# Patient Record
Sex: MALE | Race: WHITE | Employment: OTHER | URBAN - METROPOLITAN AREA
[De-identification: names, ages, dates, MRNs, and addresses within clinical notes are randomized per-mention and may not be internally consistent; named-entity substitution may affect disease eponyms.]

---

## 2017-03-08 PROBLEM — Z79.899 ENCOUNTER FOR LONG-TERM (CURRENT) USE OF MEDICATIONS: Status: ACTIVE | Noted: 2017-03-08

## 2017-09-05 PROCEDURE — 82043 UR ALBUMIN QUANTITATIVE: CPT | Performed by: INTERNAL MEDICINE

## 2017-09-05 PROCEDURE — 84153 ASSAY OF PSA TOTAL: CPT | Performed by: INTERNAL MEDICINE

## 2017-09-05 PROCEDURE — 82570 ASSAY OF URINE CREATININE: CPT | Performed by: INTERNAL MEDICINE

## 2018-03-12 PROBLEM — I77.1 TORTUOUS AORTA (HCC): Status: ACTIVE | Noted: 2018-03-12

## 2018-03-12 PROBLEM — F32.4 MAJOR DEPRESSIVE DISORDER IN PARTIAL REMISSION (HCC): Status: ACTIVE | Noted: 2018-03-12

## 2018-03-13 PROBLEM — Z79.899 ENCOUNTER FOR LONG-TERM (CURRENT) USE OF MEDICATIONS: Status: ACTIVE | Noted: 2018-03-13

## 2018-03-13 PROBLEM — R13.14 PHARYNGOESOPHAGEAL DYSPHAGIA: Status: ACTIVE | Noted: 2018-03-13

## 2018-03-13 PROBLEM — I48.91 ATRIAL FIBRILLATION, UNSPECIFIED TYPE (HCC): Status: ACTIVE | Noted: 2018-03-13

## 2018-05-31 ENCOUNTER — LAB ENCOUNTER (OUTPATIENT)
Dept: LAB | Facility: HOSPITAL | Age: 83
End: 2018-05-31
Attending: INTERNAL MEDICINE
Payer: MEDICARE

## 2018-05-31 ENCOUNTER — HOSPITAL ENCOUNTER (OUTPATIENT)
Dept: GENERAL RADIOLOGY | Facility: HOSPITAL | Age: 83
Discharge: HOME | End: 2018-05-31
Attending: INTERNAL MEDICINE
Payer: MEDICARE

## 2018-05-31 DIAGNOSIS — T17.928A ASPIRATION OF FOOD, INITIAL ENCOUNTER: ICD-10-CM

## 2018-05-31 DIAGNOSIS — K92.1 BLACK STOOL: ICD-10-CM

## 2018-05-31 DIAGNOSIS — R13.14 DYSPHAGIA, PHARYNGOESOPHAGEAL: ICD-10-CM

## 2018-05-31 PROCEDURE — 92611 MOTION FLUOROSCOPY/SWALLOW: CPT

## 2018-05-31 PROCEDURE — 85025 COMPLETE CBC W/AUTO DIFF WBC: CPT

## 2018-05-31 PROCEDURE — 36415 COLL VENOUS BLD VENIPUNCTURE: CPT

## 2018-05-31 PROCEDURE — 74230 X-RAY XM SWLNG FUNCJ C+: CPT | Performed by: INTERNAL MEDICINE

## 2018-05-31 NOTE — PROGRESS NOTES
ADULT VIDEOFLUOROSCOPIC SWALLOWING STUDY    Admission Date: 5/31/2018  Evaluation Date: 05/31/18  Radiologist: Dr. Pisano Hands: Regular  Diet Recommendations - Liquid:  Thin    Further Follow-up:        Sudarshan successfully clearing his vocal quality. Family/Patient Goals: To avoid aspiration     ASSESSMENT   DYSPHAGIA ASSESSMENT  Test completed in conjunction with Radiologist.  Patient Positioned: Upright;Midline;Standard Chair.   Patient Viewed: Lateral. pharyngeal retention. Patient was not observed to aspirate any po. Patient able to clear residue from pharynx with multiple dry swallows but required cues to do so.   Suspect aspiration noted during esophagram completed 5/10/18 was due to nature of rate a

## 2018-06-05 PROBLEM — F32.4 MAJOR DEPRESSIVE DISORDER IN PARTIAL REMISSION (HCC): Status: ACTIVE | Noted: 2018-06-05

## 2018-06-07 ENCOUNTER — LABORATORY ENCOUNTER (OUTPATIENT)
Dept: LAB | Age: 83
End: 2018-06-07
Attending: INTERNAL MEDICINE
Payer: MEDICARE

## 2018-06-07 DIAGNOSIS — D50.0 IRON DEFICIENCY ANEMIA DUE TO CHRONIC BLOOD LOSS: ICD-10-CM

## 2018-06-07 DIAGNOSIS — Z12.11 ENCOUNTER FOR HEMOCCULT SCREENING: ICD-10-CM

## 2018-06-07 PROCEDURE — 85027 COMPLETE CBC AUTOMATED: CPT

## 2018-06-07 PROCEDURE — 36415 COLL VENOUS BLD VENIPUNCTURE: CPT

## 2018-06-10 PROBLEM — Z79.899 ENCOUNTER FOR LONG-TERM (CURRENT) USE OF MEDICATIONS: Status: ACTIVE | Noted: 2018-06-10

## 2018-06-10 PROBLEM — T17.908A ASPIRATION INTO RESPIRATORY TRACT: Status: ACTIVE | Noted: 2018-06-10

## 2018-06-10 PROBLEM — R13.19 DYSPHAGIA CAUSING PULMONARY ASPIRATION WITH SWALLOWING: Status: ACTIVE | Noted: 2018-06-10

## 2018-06-21 ENCOUNTER — LAB ENCOUNTER (OUTPATIENT)
Dept: LAB | Age: 83
End: 2018-06-21
Attending: RADIOLOGY
Payer: MEDICARE

## 2018-06-21 DIAGNOSIS — K92.1 BLACK STOOL: ICD-10-CM

## 2018-06-21 PROCEDURE — 85025 COMPLETE CBC W/AUTO DIFF WBC: CPT

## 2018-06-21 PROCEDURE — 36415 COLL VENOUS BLD VENIPUNCTURE: CPT

## 2018-08-09 PROBLEM — B02.29 PHN (POSTHERPETIC NEURALGIA): Status: ACTIVE | Noted: 2018-08-09

## 2018-08-09 PROBLEM — F32.5 MAJOR DEPRESSIVE DISORDER WITH SINGLE EPISODE, IN FULL REMISSION (HCC): Status: ACTIVE | Noted: 2018-08-09

## 2018-08-09 PROBLEM — I48.20 CHRONIC ATRIAL FIBRILLATION (HCC): Status: ACTIVE | Noted: 2018-03-13

## 2020-02-24 ENCOUNTER — LAB ENCOUNTER (OUTPATIENT)
Dept: LAB | Age: 85
End: 2020-02-24
Attending: INTERNAL MEDICINE
Payer: MEDICARE

## 2020-02-24 DIAGNOSIS — I10 ESSENTIAL HYPERTENSION: ICD-10-CM

## 2020-02-24 DIAGNOSIS — E03.8 OTHER SPECIFIED HYPOTHYROIDISM: ICD-10-CM

## 2020-02-24 DIAGNOSIS — E78.2 MIXED HYPERLIPIDEMIA: ICD-10-CM

## 2020-02-24 DIAGNOSIS — Z85.46 HISTORY OF PROSTATE CANCER: ICD-10-CM

## 2020-02-24 DIAGNOSIS — Z86.2 HISTORY OF ANEMIA: ICD-10-CM

## 2020-02-24 LAB
ALBUMIN SERPL-MCNC: 3.5 G/DL (ref 3.4–5)
ALBUMIN/GLOB SERPL: 1 {RATIO} (ref 1–2)
ALP LIVER SERPL-CCNC: 84 U/L (ref 45–117)
ALT SERPL-CCNC: 117 U/L (ref 16–61)
ANION GAP SERPL CALC-SCNC: 5 MMOL/L (ref 0–18)
AST SERPL-CCNC: 89 U/L (ref 15–37)
BILIRUB SERPL-MCNC: 1.1 MG/DL (ref 0.1–2)
BUN BLD-MCNC: 20 MG/DL (ref 7–18)
BUN/CREAT SERPL: 21.7 (ref 10–20)
CALCIUM BLD-MCNC: 8.8 MG/DL (ref 8.5–10.1)
CHLORIDE SERPL-SCNC: 109 MMOL/L (ref 98–112)
CHOLEST SMN-MCNC: 81 MG/DL (ref ?–200)
CO2 SERPL-SCNC: 25 MMOL/L (ref 21–32)
CREAT BLD-MCNC: 0.92 MG/DL (ref 0.7–1.3)
DEPRECATED RDW RBC AUTO: 45 FL (ref 35.1–46.3)
ERYTHROCYTE [DISTWIDTH] IN BLOOD BY AUTOMATED COUNT: 13.2 % (ref 11–15)
GLOBULIN PLAS-MCNC: 3.5 G/DL (ref 2.8–4.4)
GLUCOSE BLD-MCNC: 96 MG/DL (ref 70–99)
HCT VFR BLD AUTO: 40 % (ref 39–53)
HDLC SERPL-MCNC: 33 MG/DL (ref 40–59)
HGB BLD-MCNC: 12.9 G/DL (ref 13–17.5)
LDLC SERPL CALC-MCNC: 37 MG/DL (ref ?–100)
M PROTEIN MFR SERPL ELPH: 7 G/DL (ref 6.4–8.2)
MCH RBC QN AUTO: 30 PG (ref 26–34)
MCHC RBC AUTO-ENTMCNC: 32.3 G/DL (ref 31–37)
MCV RBC AUTO: 93 FL (ref 80–100)
NONHDLC SERPL-MCNC: 48 MG/DL (ref ?–130)
OSMOLALITY SERPL CALC.SUM OF ELEC: 290 MOSM/KG (ref 275–295)
PATIENT FASTING Y/N/NP: YES
PATIENT FASTING Y/N/NP: YES
PLATELET # BLD AUTO: 165 10(3)UL (ref 150–450)
POTASSIUM SERPL-SCNC: 4.2 MMOL/L (ref 3.5–5.1)
PSA SERPL-MCNC: 0.02 NG/ML (ref ?–4)
RBC # BLD AUTO: 4.3 X10(6)UL (ref 3.8–5.8)
SODIUM SERPL-SCNC: 139 MMOL/L (ref 136–145)
TRIGL SERPL-MCNC: 54 MG/DL (ref 30–149)
TSI SER-ACNC: 2.14 MIU/ML (ref 0.36–3.74)
VLDLC SERPL CALC-MCNC: 11 MG/DL (ref 0–30)
WBC # BLD AUTO: 8.6 X10(3) UL (ref 4–11)

## 2020-02-24 PROCEDURE — 85027 COMPLETE CBC AUTOMATED: CPT

## 2020-02-24 PROCEDURE — 84153 ASSAY OF PSA TOTAL: CPT

## 2020-02-24 PROCEDURE — 84443 ASSAY THYROID STIM HORMONE: CPT

## 2020-02-24 PROCEDURE — 80053 COMPREHEN METABOLIC PANEL: CPT

## 2020-02-24 PROCEDURE — 80061 LIPID PANEL: CPT

## 2020-02-24 PROCEDURE — 36415 COLL VENOUS BLD VENIPUNCTURE: CPT

## 2020-02-24 NOTE — PROGRESS NOTES
Chol is controlled with atorvastatin. CMP is okay. LFT's elevated. TSH is normal with levothyroxine 112mcg daily. PSA remains near undetectable. CBC is fine. Will review at visit.

## 2020-02-25 PROBLEM — F13.20 SEDATIVE DEPENDENCE (HCC): Status: ACTIVE | Noted: 2020-02-25

## 2020-02-25 PROBLEM — D69.2 SENILE PURPURA (HCC): Status: ACTIVE | Noted: 2020-02-25

## 2020-02-25 NOTE — PROGRESS NOTES
Released to [address]     Future Appointments  3/3/2020   8:00 AM    JULY LAYNE 2               [facility]          [facility]

## 2020-03-10 ENCOUNTER — LAB ENCOUNTER (OUTPATIENT)
Dept: LAB | Age: 85
End: 2020-03-10
Attending: INTERNAL MEDICINE
Payer: MEDICARE

## 2020-03-10 DIAGNOSIS — R79.89 ELEVATED LFTS: ICD-10-CM

## 2020-03-10 LAB
ALBUMIN SERPL-MCNC: 3.6 G/DL (ref 3.4–5)
ALBUMIN/GLOB SERPL: 1 {RATIO} (ref 1–2)
ALP LIVER SERPL-CCNC: 87 U/L (ref 45–117)
ALT SERPL-CCNC: 37 U/L (ref 16–61)
ANION GAP SERPL CALC-SCNC: 7 MMOL/L (ref 0–18)
AST SERPL-CCNC: 23 U/L (ref 15–37)
BILIRUB SERPL-MCNC: 1.1 MG/DL (ref 0.1–2)
BUN BLD-MCNC: 18 MG/DL (ref 7–18)
BUN/CREAT SERPL: 20 (ref 10–20)
CALCIUM BLD-MCNC: 9.1 MG/DL (ref 8.5–10.1)
CERULOPLASMIN SERPL-MCNC: 26.2 MG/DL (ref 20–60)
CHLORIDE SERPL-SCNC: 109 MMOL/L (ref 98–112)
CO2 SERPL-SCNC: 24 MMOL/L (ref 21–32)
CREAT BLD-MCNC: 0.9 MG/DL (ref 0.7–1.3)
DEPRECATED HBV CORE AB SER IA-ACNC: 58.6 NG/ML (ref 30–530)
GLOBULIN PLAS-MCNC: 3.6 G/DL (ref 2.8–4.4)
GLUCOSE BLD-MCNC: 82 MG/DL (ref 70–99)
HBV SURFACE AB SER QL: NONREACTIVE
HBV SURFACE AB SERPL IA-ACNC: <3.1 MIU/ML
HBV SURFACE AG SER-ACNC: <0.1 [IU]/L
HBV SURFACE AG SERPL QL IA: NONREACTIVE
IRON SATURATION: 20 % (ref 20–50)
IRON SERPL-MCNC: 75 UG/DL (ref 65–175)
M PROTEIN MFR SERPL ELPH: 7.2 G/DL (ref 6.4–8.2)
OSMOLALITY SERPL CALC.SUM OF ELEC: 291 MOSM/KG (ref 275–295)
PATIENT FASTING Y/N/NP: YES
POTASSIUM SERPL-SCNC: 3.9 MMOL/L (ref 3.5–5.1)
SODIUM SERPL-SCNC: 140 MMOL/L (ref 136–145)
TOTAL IRON BINDING CAPACITY: 378 UG/DL (ref 240–450)
TRANSFERRIN SERPL-MCNC: 254 MG/DL (ref 200–360)

## 2020-03-10 PROCEDURE — 87522 HEPATITIS C REVRS TRNSCRPJ: CPT

## 2020-03-10 PROCEDURE — 83516 IMMUNOASSAY NONANTIBODY: CPT

## 2020-03-10 PROCEDURE — 87340 HEPATITIS B SURFACE AG IA: CPT

## 2020-03-10 PROCEDURE — 83540 ASSAY OF IRON: CPT

## 2020-03-10 PROCEDURE — 83550 IRON BINDING TEST: CPT

## 2020-03-10 PROCEDURE — 86706 HEP B SURFACE ANTIBODY: CPT

## 2020-03-10 PROCEDURE — 36415 COLL VENOUS BLD VENIPUNCTURE: CPT

## 2020-03-10 PROCEDURE — 80053 COMPREHEN METABOLIC PANEL: CPT

## 2020-03-10 PROCEDURE — 86038 ANTINUCLEAR ANTIBODIES: CPT

## 2020-03-10 PROCEDURE — 82728 ASSAY OF FERRITIN: CPT

## 2020-03-10 PROCEDURE — 82390 ASSAY OF CERULOPLASMIN: CPT

## 2020-03-11 LAB
ANA SCREEN: NEGATIVE
F-ACTIN (SMOOTH MUSCLE) AB: 12 UNITS
HCV RNA SERPL QL NAA+PROBE: NOT DETECTED

## 2020-03-12 NOTE — PROGRESS NOTES
DANIEL negative. Anti-smooth muscle Ab negative. Hep C negative. Hep B negative. Ceruloplasmin normal.  Iron studies normal.  CMP normal.  LFT's normalized.     Liver normal.  No evidence for any autoimmune hepatitis, viral hepatitis, Roe's disease or i

## 2020-08-03 ENCOUNTER — APPOINTMENT (OUTPATIENT)
Dept: CV DIAGNOSTICS | Facility: HOSPITAL | Age: 85
End: 2020-08-03
Attending: INTERNAL MEDICINE
Payer: MEDICARE

## 2020-08-03 ENCOUNTER — APPOINTMENT (OUTPATIENT)
Dept: CT IMAGING | Facility: HOSPITAL | Age: 85
End: 2020-08-03
Attending: EMERGENCY MEDICINE
Payer: MEDICARE

## 2020-08-03 ENCOUNTER — HOSPITAL ENCOUNTER (OUTPATIENT)
Facility: HOSPITAL | Age: 85
Setting detail: OBSERVATION
Discharge: NURSING, REHAB OR LONG-TERM CARE | End: 2020-08-10
Attending: EMERGENCY MEDICINE | Admitting: HOSPITALIST
Payer: MEDICARE

## 2020-08-03 ENCOUNTER — APPOINTMENT (OUTPATIENT)
Dept: GENERAL RADIOLOGY | Facility: HOSPITAL | Age: 85
End: 2020-08-03
Attending: EMERGENCY MEDICINE
Payer: MEDICARE

## 2020-08-03 DIAGNOSIS — R41.0 CONFUSION: ICD-10-CM

## 2020-08-03 DIAGNOSIS — R77.8 ELEVATED TROPONIN: Primary | ICD-10-CM
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
## 2020-08-03 DIAGNOSIS — M62.82 NON-TRAUMATIC RHABDOMYOLYSIS: ICD-10-CM

## 2020-08-03 LAB
ALBUMIN SERPL-MCNC: 3.7 G/DL (ref 3.4–5)
ALBUMIN/GLOB SERPL: 1.1 {RATIO} (ref 1–2)
ALP LIVER SERPL-CCNC: 91 U/L (ref 45–117)
ALT SERPL-CCNC: 36 U/L (ref 16–61)
ANION GAP SERPL CALC-SCNC: 6 MMOL/L (ref 0–18)
AST SERPL-CCNC: 61 U/L (ref 15–37)
ATRIAL RATE: 94 BPM
BASOPHILS # BLD AUTO: 0.04 X10(3) UL (ref 0–0.2)
BASOPHILS NFR BLD AUTO: 0.4 %
BILIRUB SERPL-MCNC: 1.6 MG/DL (ref 0.1–2)
BILIRUB UR QL STRIP.AUTO: NEGATIVE
BUN BLD-MCNC: 23 MG/DL (ref 7–18)
BUN/CREAT SERPL: 24.5 (ref 10–20)
CALCIUM BLD-MCNC: 9.5 MG/DL (ref 8.5–10.1)
CHLORIDE SERPL-SCNC: 106 MMOL/L (ref 98–112)
CK SERPL-CCNC: 1257 U/L (ref 39–308)
CO2 SERPL-SCNC: 27 MMOL/L (ref 21–32)
CREAT BLD-MCNC: 0.94 MG/DL (ref 0.7–1.3)
DEPRECATED RDW RBC AUTO: 57.6 FL (ref 35.1–46.3)
EOSINOPHIL # BLD AUTO: 0 X10(3) UL (ref 0–0.7)
EOSINOPHIL NFR BLD AUTO: 0 %
ERYTHROCYTE [DISTWIDTH] IN BLOOD BY AUTOMATED COUNT: 21.1 % (ref 11–15)
GLOBULIN PLAS-MCNC: 3.4 G/DL (ref 2.8–4.4)
GLUCOSE BLD-MCNC: 114 MG/DL (ref 70–99)
GLUCOSE UR STRIP.AUTO-MCNC: NEGATIVE MG/DL
HCT VFR BLD AUTO: 30.3 % (ref 39–53)
HGB BLD-MCNC: 9 G/DL (ref 13–17.5)
IMM GRANULOCYTES # BLD AUTO: 0.04 X10(3) UL (ref 0–1)
IMM GRANULOCYTES NFR BLD: 0.4 %
KETONES UR STRIP.AUTO-MCNC: NEGATIVE MG/DL
LEUKOCYTE ESTERASE UR QL STRIP.AUTO: NEGATIVE
LYMPHOCYTES # BLD AUTO: 1.85 X10(3) UL (ref 1–4)
LYMPHOCYTES NFR BLD AUTO: 18.3 %
M PROTEIN MFR SERPL ELPH: 7.1 G/DL (ref 6.4–8.2)
MCH RBC QN AUTO: 22.7 PG (ref 26–34)
MCHC RBC AUTO-ENTMCNC: 29.7 G/DL (ref 31–37)
MCV RBC AUTO: 76.5 FL (ref 80–100)
MONOCYTES # BLD AUTO: 0.76 X10(3) UL (ref 0.1–1)
MONOCYTES NFR BLD AUTO: 7.5 %
NEUTROPHILS # BLD AUTO: 7.41 X10 (3) UL (ref 1.5–7.7)
NEUTROPHILS # BLD AUTO: 7.41 X10(3) UL (ref 1.5–7.7)
NEUTROPHILS NFR BLD AUTO: 73.4 %
NITRITE UR QL STRIP.AUTO: NEGATIVE
OSMOLALITY SERPL CALC.SUM OF ELEC: 293 MOSM/KG (ref 275–295)
PH UR STRIP.AUTO: 5 [PH] (ref 4.5–8)
PLATELET # BLD AUTO: 224 10(3)UL (ref 150–450)
POTASSIUM SERPL-SCNC: 3.7 MMOL/L (ref 3.5–5.1)
PROT UR STRIP.AUTO-MCNC: 100 MG/DL
Q-T INTERVAL: 426 MS
QRS DURATION: 100 MS
QTC CALCULATION (BEZET): 469 MS
R AXIS: -21 DEGREES
RBC # BLD AUTO: 3.96 X10(6)UL (ref 3.8–5.8)
SARS-COV-2 RNA RESP QL NAA+PROBE: NOT DETECTED
SODIUM SERPL-SCNC: 139 MMOL/L (ref 136–145)
SP GR UR STRIP.AUTO: 1.02 (ref 1–1.03)
T AXIS: 116 DEGREES
TROPONIN I SERPL-MCNC: 0.15 NG/ML (ref ?–0.04)
UROBILINOGEN UR STRIP.AUTO-MCNC: <2 MG/DL
VENTRICULAR RATE: 73 BPM
WBC # BLD AUTO: 10.1 X10(3) UL (ref 4–11)

## 2020-08-03 PROCEDURE — 93005 ELECTROCARDIOGRAM TRACING: CPT

## 2020-08-03 PROCEDURE — 99203 OFFICE O/P NEW LOW 30 MIN: CPT | Performed by: INTERNAL MEDICINE

## 2020-08-03 PROCEDURE — 81001 URINALYSIS AUTO W/SCOPE: CPT | Performed by: EMERGENCY MEDICINE

## 2020-08-03 PROCEDURE — 96361 HYDRATE IV INFUSION ADD-ON: CPT

## 2020-08-03 PROCEDURE — 80053 COMPREHEN METABOLIC PANEL: CPT | Performed by: EMERGENCY MEDICINE

## 2020-08-03 PROCEDURE — 70450 CT HEAD/BRAIN W/O DYE: CPT | Performed by: EMERGENCY MEDICINE

## 2020-08-03 PROCEDURE — 93306 TTE W/DOPPLER COMPLETE: CPT | Performed by: INTERNAL MEDICINE

## 2020-08-03 PROCEDURE — 93010 ELECTROCARDIOGRAM REPORT: CPT

## 2020-08-03 PROCEDURE — 73560 X-RAY EXAM OF KNEE 1 OR 2: CPT | Performed by: EMERGENCY MEDICINE

## 2020-08-03 PROCEDURE — 99285 EMERGENCY DEPT VISIT HI MDM: CPT

## 2020-08-03 PROCEDURE — 82550 ASSAY OF CK (CPK): CPT | Performed by: EMERGENCY MEDICINE

## 2020-08-03 PROCEDURE — 96372 THER/PROPH/DIAG INJ SC/IM: CPT

## 2020-08-03 PROCEDURE — 84484 ASSAY OF TROPONIN QUANT: CPT | Performed by: EMERGENCY MEDICINE

## 2020-08-03 PROCEDURE — 72125 CT NECK SPINE W/O DYE: CPT | Performed by: EMERGENCY MEDICINE

## 2020-08-03 PROCEDURE — 96365 THER/PROPH/DIAG IV INF INIT: CPT

## 2020-08-03 PROCEDURE — 85025 COMPLETE CBC W/AUTO DIFF WBC: CPT | Performed by: EMERGENCY MEDICINE

## 2020-08-03 RX ORDER — LEVOTHYROXINE SODIUM 112 UG/1
112 TABLET ORAL
Status: ON HOLD | COMMUNITY
End: 2020-08-07

## 2020-08-03 RX ORDER — HEPARIN SODIUM 5000 [USP'U]/ML
5000 INJECTION, SOLUTION INTRAVENOUS; SUBCUTANEOUS EVERY 8 HOURS SCHEDULED
Status: DISCONTINUED | OUTPATIENT
Start: 2020-08-03 | End: 2020-08-04

## 2020-08-03 RX ORDER — TRAZODONE HYDROCHLORIDE 50 MG/1
50 TABLET ORAL NIGHTLY
COMMUNITY
End: 2020-10-18

## 2020-08-03 RX ORDER — TRAZODONE HYDROCHLORIDE 50 MG/1
50 TABLET ORAL NIGHTLY
Status: DISCONTINUED | OUTPATIENT
Start: 2020-08-03 | End: 2020-08-10

## 2020-08-03 RX ORDER — PAROXETINE 10 MG/1
10 TABLET, FILM COATED ORAL DAILY
Status: DISCONTINUED | OUTPATIENT
Start: 2020-08-04 | End: 2020-08-10

## 2020-08-03 RX ORDER — SENNOSIDES 8.6 MG
650 CAPSULE ORAL 2 TIMES DAILY
COMMUNITY

## 2020-08-03 RX ORDER — DOCUSATE SODIUM 100 MG/1
100 CAPSULE, LIQUID FILLED ORAL 2 TIMES DAILY PRN
Status: DISCONTINUED | OUTPATIENT
Start: 2020-08-03 | End: 2020-08-09

## 2020-08-03 RX ORDER — SODIUM CHLORIDE 9 MG/ML
INJECTION, SOLUTION INTRAVENOUS ONCE
Status: COMPLETED | OUTPATIENT
Start: 2020-08-03 | End: 2020-08-03

## 2020-08-03 RX ORDER — POLYETHYLENE GLYCOL 3350 17 G/17G
17 POWDER, FOR SOLUTION ORAL DAILY PRN
Status: DISCONTINUED | OUTPATIENT
Start: 2020-08-03 | End: 2020-08-10

## 2020-08-03 RX ORDER — ASPIRIN 81 MG/1
81 TABLET ORAL DAILY
Status: DISCONTINUED | OUTPATIENT
Start: 2020-08-04 | End: 2020-08-10

## 2020-08-03 RX ORDER — PAROXETINE 10 MG/1
10 TABLET, FILM COATED ORAL DAILY
COMMUNITY
End: 2020-10-18

## 2020-08-03 RX ORDER — LEVOTHYROXINE SODIUM 112 UG/1
112 TABLET ORAL
Status: DISCONTINUED | OUTPATIENT
Start: 2020-08-04 | End: 2020-08-04

## 2020-08-03 RX ORDER — FUROSEMIDE 20 MG/1
20 TABLET ORAL DAILY PRN
Status: ON HOLD | COMMUNITY
End: 2020-08-05

## 2020-08-03 RX ORDER — ACETAMINOPHEN 325 MG/1
650 TABLET ORAL EVERY 6 HOURS PRN
Status: DISCONTINUED | OUTPATIENT
Start: 2020-08-03 | End: 2020-08-10

## 2020-08-03 RX ORDER — BUPRENORPHINE HCL 8 MG/1
1 TABLET SUBLINGUAL DAILY
COMMUNITY
End: 2020-08-03

## 2020-08-03 NOTE — CONSULTS
[facility]    PATIENT'S NAME: Laureen Gold   ATTENDING PHYSICIAN: Xiomara Edgar D.O.   CONSULTING PHYSICIAN: Mani Li M.D.    PATIENT ACCOUNT#:   [de-identified]    LOCATION:  [address] [place] [address] [facility]  MEDICAL RECORD #:   [id number]       DATE OF B changes. The patient is not short of breath, has no peripheral edema, and has no chest pain currently. PAST MEDICAL HISTORY:  CAD, heart failure.     MEDICATIONS:  Home medications:  Levothyroxine 112 mcg per day; paroxetine 10 mg at dinner; trazodone 5 function. Conservative medical management approach. He was moved up from 81 mg to 325. We will stop his diuretics at this point. He may need restart of ramipril but need to see what his blood pressure does.     Postdischarge, other plan is to him follow

## 2020-08-03 NOTE — ED INITIAL ASSESSMENT (HPI)
Family called ems tonoc for ams  Pt has dementia and has fallen twice in past 24 hrs  ccollar per ems    Daughter arrived and reports pt called his other daughter at 36 - not able to hear pt d/t phone issues.    Pt lives in [facility] in a to

## 2020-08-03 NOTE — CONSULTS
Cardiology Consult Note     PRIMARY CARDIOLOGIST: Dr. Chelo Hall with [facility]      CONSULT FOR: Elevated troponin in 80year-old with recurrent falls and mild case of rhabdomyolysis.        HISTORY: 59-year-old gentleman mild hypertension previo

## 2020-08-03 NOTE — PROGRESS NOTES
08/03/20 1514   Clinical Encounter Type   Visited With Health care provider  ( responded to POLST consult - patient not charted as DNAR)   Routine Visit Introduction  (Printed POLST form and left on patient chart.  Discussion with RN regarding co

## 2020-08-03 NOTE — ED NOTES
COVID SWAB SENT. DIAPER CHANGED = URINE SOAKED. PAD AND URINAL NOW IN PLACE FOR URINE SPECIMEN. DAUGHTERS REMAIN IN CAR/PARKING LOT.

## 2020-08-03 NOTE — H&P
[facility] Hospitalist H&P       CC: Patient presents with:  Altered Mental Status  Trauma       PCP: Lilliana Hairston MD    History of Present Illness: Patient is a 80year old male with PMH sig for CAD, A fib, HTN, HL and hypothyroidism is admitted after 2 falls Medications:  Levothyroxine Sodium 112 MCG Oral Tab, Take 112 mcg by mouth before breakfast., Disp: , Rfl:   PARoxetine HCl 10 MG Oral Tab, Take 10 mg by mouth daily. With dinner, Disp: , Rfl:   traZODone HCl 50 MG Oral Tab, Take 50 mg by mouth nightly. , D Regular rate and rhythm, S1, S2 normal, no murmur, rub or gallop appreciated   Abdomen:   Soft, non-tender. Bowel sounds normal. No masses,  No organomegaly. Non distended   Extremities: Extremities normal, atraumatic, no cyanosis or edema.    Skin: Skin co Dose information is transmitted to the [facility] of Radiology) NRDR ([address]) which includes the Dose Index Registry. PATIENT STATED HISTORY: (As transcribed by Technologist)  Multiple falls over last 24 hours.     FIND mass.  There is a small right pleural effusion noted. BONES:  There is fusion across the C2-C3 disc space. There is no acute fracture. CERVICAL DISC LEVELS: C2-C3:  No significant disc/facet abnormality, spinal stenosis, or foraminal stenosis. C3-C4:   Bi cardiology  -cardiology following    Elevated AST  -check LFTs in am    Rhabdomyolysis  -gentle IVF given underlying cardiac issues  -repeat CK in am    CAD, HTN  -hold lasix given rhabdo & dehydration   -resume when ok per cardiology    Hypothyroidism  -c

## 2020-08-03 NOTE — ED NOTES
Pt's daughter Alberto Villagran can be reached on her cell phone - she is waiting in her car with other family.

## 2020-08-03 NOTE — ED PROVIDER NOTES
Patient Seen in: [facility] Emergency Department      History   Patient presents with:  Altered Mental Status  Trauma    Stated Complaint: pt has fallen twice in past 24 hrs  ems called for ams per family  hx dementia    HPI    80-year-old male hist in HPI. Constitutional and vital signs reviewed. All other systems reviewed and negative except as noted above.     Physical Exam     ED Triage Vitals [08/03/20 0541]   /75   Pulse 76   Resp 20   Temp 98.1 °F (36.7 °C)   Temp src Temporal   SpO2 (per daughter, speech at baseline)   Psychiatric:         Mood and Affect: Mood normal.             ED Course     Labs Reviewed   COMP METABOLIC PANEL (14) - Abnormal; Notable for the following components:       Result Value    Glucose 114 (*)     BUN 23 ( 8/3/2020  PROCEDURE:  XR KNEE (1 OR 2 VIEWS), RIGHT (CPT=73560)  COMPARISON:  None.   INDICATIONS:  pt has fallen twice in past 24 hrs  ems called for ams per family  hx dementia  PATIENT STATED HISTORY: (As transcribed by Technologist)  Patient's family me None.         CONCLUSION:  There is chronic and artery ischemic change and marked atrophy noted. There is no evidence of an acute abnormality on the noncontrast CT of the head.    Dictated by (CST): Vladimir Magallanes MD on 8/03/2020 at 7:07 AM     Alejandrina Daniels CONCLUSION:  There is no acute fracture in the cervical spine. There is diffuse degenerative change of discs and facets described above. Small right pleural effusion is noted incidentally.    Dictated by (CST): Karen Child MD on 8/03/2020 at 7:09 AM

## 2020-08-04 LAB
ALBUMIN SERPL-MCNC: 2.9 G/DL (ref 3.4–5)
ALBUMIN/GLOB SERPL: 1 {RATIO} (ref 1–2)
ALP LIVER SERPL-CCNC: 73 U/L (ref 45–117)
ALT SERPL-CCNC: 33 U/L (ref 16–61)
ANION GAP SERPL CALC-SCNC: 2 MMOL/L (ref 0–18)
AST SERPL-CCNC: 51 U/L (ref 15–37)
BASOPHILS # BLD AUTO: 0.06 X10(3) UL (ref 0–0.2)
BASOPHILS NFR BLD AUTO: 0.7 %
BILIRUB SERPL-MCNC: 0.8 MG/DL (ref 0.1–2)
BUN BLD-MCNC: 25 MG/DL (ref 7–18)
BUN/CREAT SERPL: 28.1 (ref 10–20)
CALCIUM BLD-MCNC: 8.3 MG/DL (ref 8.5–10.1)
CHLORIDE SERPL-SCNC: 109 MMOL/L (ref 98–112)
CHOLEST SMN-MCNC: 86 MG/DL (ref ?–200)
CK SERPL-CCNC: 505 U/L (ref 39–308)
CO2 SERPL-SCNC: 29 MMOL/L (ref 21–32)
CREAT BLD-MCNC: 0.89 MG/DL (ref 0.7–1.3)
DEPRECATED HBV CORE AB SER IA-ACNC: 21.5 NG/ML (ref 30–530)
DEPRECATED RDW RBC AUTO: 59.5 FL (ref 35.1–46.3)
EOSINOPHIL # BLD AUTO: 0.1 X10(3) UL (ref 0–0.7)
EOSINOPHIL NFR BLD AUTO: 1.1 %
ERYTHROCYTE [DISTWIDTH] IN BLOOD BY AUTOMATED COUNT: 21.2 % (ref 11–15)
GLOBULIN PLAS-MCNC: 3 G/DL (ref 2.8–4.4)
GLUCOSE BLD-MCNC: 90 MG/DL (ref 70–99)
HAV IGM SER QL: 2.2 MG/DL (ref 1.6–2.6)
HCT VFR BLD AUTO: 27.6 % (ref 39–53)
HDLC SERPL-MCNC: 41 MG/DL (ref 40–59)
HGB BLD-MCNC: 7.9 G/DL (ref 13–17.5)
IMM GRANULOCYTES # BLD AUTO: 0.03 X10(3) UL (ref 0–1)
IMM GRANULOCYTES NFR BLD: 0.3 %
IRON SATURATION: 6 % (ref 20–50)
IRON SERPL-MCNC: 23 UG/DL (ref 65–175)
LDLC SERPL CALC-MCNC: 36 MG/DL (ref ?–100)
LYMPHOCYTES # BLD AUTO: 2.36 X10(3) UL (ref 1–4)
LYMPHOCYTES NFR BLD AUTO: 26.4 %
M PROTEIN MFR SERPL ELPH: 5.9 G/DL (ref 6.4–8.2)
MCH RBC QN AUTO: 22.2 PG (ref 26–34)
MCHC RBC AUTO-ENTMCNC: 28.6 G/DL (ref 31–37)
MCV RBC AUTO: 77.5 FL (ref 80–100)
MONOCYTES # BLD AUTO: 0.83 X10(3) UL (ref 0.1–1)
MONOCYTES NFR BLD AUTO: 9.3 %
NEUTROPHILS # BLD AUTO: 5.57 X10 (3) UL (ref 1.5–7.7)
NEUTROPHILS # BLD AUTO: 5.57 X10(3) UL (ref 1.5–7.7)
NEUTROPHILS NFR BLD AUTO: 62.2 %
NONHDLC SERPL-MCNC: 45 MG/DL (ref ?–130)
OSMOLALITY SERPL CALC.SUM OF ELEC: 294 MOSM/KG (ref 275–295)
PLATELET # BLD AUTO: 201 10(3)UL (ref 150–450)
POTASSIUM SERPL-SCNC: 3.8 MMOL/L (ref 3.5–5.1)
RBC # BLD AUTO: 3.56 X10(6)UL (ref 3.8–5.8)
SODIUM SERPL-SCNC: 140 MMOL/L (ref 136–145)
T4 FREE SERPL-MCNC: 0.8 NG/DL (ref 0.8–1.7)
TOTAL IRON BINDING CAPACITY: 407 UG/DL (ref 240–450)
TRANSFERRIN SERPL-MCNC: 273 MG/DL (ref 200–360)
TRIGL SERPL-MCNC: 44 MG/DL (ref 30–149)
TROPONIN I SERPL-MCNC: 0.13 NG/ML (ref ?–0.04)
TSI SER-ACNC: 12.8 MIU/ML (ref 0.36–3.74)
VLDLC SERPL CALC-MCNC: 9 MG/DL (ref 0–30)
WBC # BLD AUTO: 9 X10(3) UL (ref 4–11)

## 2020-08-04 PROCEDURE — 99214 OFFICE O/P EST MOD 30 MIN: CPT | Performed by: INTERNAL MEDICINE

## 2020-08-04 PROCEDURE — 85025 COMPLETE CBC W/AUTO DIFF WBC: CPT | Performed by: INTERNAL MEDICINE

## 2020-08-04 PROCEDURE — 97530 THERAPEUTIC ACTIVITIES: CPT

## 2020-08-04 PROCEDURE — 82550 ASSAY OF CK (CPK): CPT | Performed by: HOSPITALIST

## 2020-08-04 PROCEDURE — 97165 OT EVAL LOW COMPLEX 30 MIN: CPT

## 2020-08-04 PROCEDURE — 83735 ASSAY OF MAGNESIUM: CPT | Performed by: INTERNAL MEDICINE

## 2020-08-04 PROCEDURE — 82728 ASSAY OF FERRITIN: CPT | Performed by: HOSPITALIST

## 2020-08-04 PROCEDURE — 84443 ASSAY THYROID STIM HORMONE: CPT | Performed by: INTERNAL MEDICINE

## 2020-08-04 PROCEDURE — 83550 IRON BINDING TEST: CPT | Performed by: HOSPITALIST

## 2020-08-04 PROCEDURE — 97535 SELF CARE MNGMENT TRAINING: CPT

## 2020-08-04 PROCEDURE — 80053 COMPREHEN METABOLIC PANEL: CPT | Performed by: INTERNAL MEDICINE

## 2020-08-04 PROCEDURE — 97162 PT EVAL MOD COMPLEX 30 MIN: CPT

## 2020-08-04 PROCEDURE — 83540 ASSAY OF IRON: CPT | Performed by: HOSPITALIST

## 2020-08-04 PROCEDURE — 84439 ASSAY OF FREE THYROXINE: CPT | Performed by: INTERNAL MEDICINE

## 2020-08-04 PROCEDURE — 84484 ASSAY OF TROPONIN QUANT: CPT | Performed by: INTERNAL MEDICINE

## 2020-08-04 PROCEDURE — 80061 LIPID PANEL: CPT | Performed by: INTERNAL MEDICINE

## 2020-08-04 PROCEDURE — 96375 TX/PRO/DX INJ NEW DRUG ADDON: CPT

## 2020-08-04 PROCEDURE — 96372 THER/PROPH/DIAG INJ SC/IM: CPT

## 2020-08-04 RX ORDER — LEVOTHYROXINE SODIUM 0.12 MG/1
125 TABLET ORAL
Status: DISCONTINUED | OUTPATIENT
Start: 2020-08-05 | End: 2020-08-10

## 2020-08-04 RX ORDER — PANTOPRAZOLE SODIUM 40 MG/1
40 TABLET, DELAYED RELEASE ORAL
Status: DISCONTINUED | OUTPATIENT
Start: 2020-08-04 | End: 2020-08-04

## 2020-08-04 RX ORDER — PANTOPRAZOLE SODIUM 40 MG/1
40 TABLET, DELAYED RELEASE ORAL
Status: DISCONTINUED | OUTPATIENT
Start: 2020-08-04 | End: 2020-08-10

## 2020-08-04 RX ORDER — POTASSIUM CHLORIDE 20 MEQ/1
40 TABLET, EXTENDED RELEASE ORAL ONCE
Status: COMPLETED | OUTPATIENT
Start: 2020-08-04 | End: 2020-08-04

## 2020-08-04 NOTE — PROGRESS NOTES
08/04/20 1130   Clinical Encounter Type   Visited With Patient   Patient's Supportive Strategies/Resources Patient finds community/spiritual support from his neighbor, a deacon, who brings him Denominational communion.     Hindu Encounters   Hindu Need

## 2020-08-04 NOTE — PLAN OF CARE
Received patient, sleeping, rousable. Forgetful. Denied chest pain, denied SOB. Dsicussed POc. Safety measures reinforced, call light within reach, bed alarm on. Needs attended to. Will continue to monitor.     Problem: CARDIOVASCULAR - ADULT  Goal: Maint

## 2020-08-04 NOTE — CONSULTS
[facility]    Roberto Dobson Patient Status:  Observation    1929 MRN [id number]   [facility] 8NE-A Attending Sami Skinner,    Hosp Day # 0 PCP Khoi Warner MD     Patient Identification  Roberto Dobson is a 80year old valve: Trivial regurgitation. 3.  Aorta: Ascending aorta diameter was 3.6 -3.7 cm.  4.  Mitral valve: There was mild regurgitation. 5.  Left atrium: The left atrium was moderately dilated. The left atrial      volume was moderately to markedly increased. • RIGHT PHACOEMULSIFICATION OF CATARACT WITH INTRAOCULAR LENS IMPLANT 69015 Right 2/19/2014    Performed by Yonatan Zamora MD at [facility]       [COMPLETED] Potassium Chloride ER (K-DUR M20) CR tab 40 mEq, 40 mEq, Oral, Once  [COMPLETED] ALT 33 08/04/2020    T4F 0.8 08/04/2020    TSH 12.800 08/04/2020    MG 2.2 08/04/2020    TROP 0.129 08/04/2020     08/04/2020       Review of Systems:  Complete review of systems performed and negative except as that outlined in HPI.       OBJECTIVE: ASSESSMENT:  Impaired mobility and self-care secondary to Recent falls x2. Possible dehydration    ?  UTI      PLAN:                   Based on patient's current functional status, pt would benefit from acute Rehabilitation, working

## 2020-08-04 NOTE — CM/SW NOTE
Dr. Tennille Newman saw pt for possibility of acute rehab, and she is recommending acute rehab at discharge. SW spoke to pt's daughter, Marla Zhong, to inform her of the update. Daughter was very appreciative, and is familiar with [facility].  She stated that pt/family wou

## 2020-08-04 NOTE — CM/SW NOTE
08/04/20 1400   CM/SW Referral Data   Referral Source Social Work (self-referral)   Reason for Referral Discharge planning   Informant Patient; Children   Patient Info   Patient's Mental Status Alert;Oriented   Patient Communication Issues Hearing impair explained the [place] referral process. Daughter has no preference of [facility] choices. Referrals started in [place]. DON requested. Will follow up with daughter on Wednesday on where pt is accepted at for [facility].     SW also called [facility] at [facility] to see if pt is appropr Chief Complaint:    HPI:  76 year old female with PMHx HTN, HLD, CAD, ADVANCED COPD ON HOME O2 (3LITERS), IDDM, CHRONIC DIASTOLIC HEART FAILURE, RECENT ADMISSIONS FOR HYPOXIC RESPIRATORY FAILURE STATES SHE WAS LEAVING [employer] [facility] EARLIER TODAY WITH HER BROTHER AND MISSTEPPED RESULTING IN A FALL.  SHE REPORTS HAVING NECK AND LOWER BACK PAIN, ALSO LEFT HIP PAIN.  PT DENIES ANY CHEST PAIN OR SHORTNESS OF BREATH, NO NAUSEA OR VOMITING. JUST RECEIVED DILAUDID AND FEELS SOMEWHAT BETTER. (27 Sep 2017 19:56)    : History reviewed; as above. Pulmonary consult requested for pre-op pulmonary eval. Patient followed by Dr. Forte in the office. Patient is presently breathing comfortably, luing flat in bed. . Family at bedside. She is sleeping but is arousable; denies shortness of breath.     Pulmonary hx significant for COPD and lung cancer; S/P lobectomy.    PAST MEDICAL & SURGICAL HISTORY:  Chronic diastolic congestive heart failure  Hyperlipidemia, unspecified hyperlipidemia type  Essential hypertension  Neuropathy  Diabetes  Chronic obstructive pulmonary disease, unspecified COPD type  History of total knee replacement, unspecified laterality  H/O hernia repair   delivery delivered  S/P appendectomy    Social Hx: Former smoker.     REVIEW OF SYSTEMS:    CONSTITUTIONAL: No weakness, fevers or chills  EYES/ENT: No visual changes;  No vertigo or throat pain   NECK: No pain or stiffness  RESPIRATORY: No cough, wheezing, hemoptysis; No shortness of breath  CARDIOVASCULAR: No chest pain or palpitations  GASTROINTESTINAL: No abdominal or epigastric pain. No nausea, vomiting, or hematemesis; No diarrhea or constipation. No melena or hematochezia.  GENITOURINARY: No dysuria, frequency or hematuria  NEUROLOGICAL: No numbness or weakness  SKIN: No itching, burning, rashes, or lesions   All other review of systems is negative unless indicated above    Vital Signs Last 24 Hrs  T(C): 37.4 (28 Sep 2017 05:13), Max: 37.4 (28 Sep 2017 05:13)  T(F): 99.4 (28 Sep 2017 05:13), Max: 99.4 (28 Sep 2017 05:13)  HR: 84 (28 Sep 2017 05:13) (51 - 84)  BP: 142/38 (28 Sep 2017 05:13) (126/50 - 142/38)  BP(mean): --  RR: 13 (27 Sep 2017 20:16) (13 - 20)  SpO2: 92% (28 Sep 2017 05:13) (92% - 96%)    I&O's Summary    27 Sep 2017 07:01  -  28 Sep 2017 07:00  --------------------------------------------------------  IN: 0 mL / OUT: 650 mL / NET: -650 mL        PHYSICAL EXAM:    Constitutional: NAD, awake and alert, well-developed  Neck: Soft and supple, No LAD, No JVD  Respiratory: Breath sounds are clear bilaterally, No wheezing, rales or rhonchi  Cardiovascular: S1 and S2, regular rate and rhythm, no Murmurs, gallops or rubs  Gastrointestinal: Bowel Sounds present, soft, nontender, nondistended, no guarding, no rebound  Extremities: No peripheral edema  Neurological: A/O x 3, no focal deficits  Skin: No rashes    Medications:  MEDICATIONS  (STANDING):  docusate sodium 100 milliGRAM(s) Oral three times a day  diltiazem    milliGRAM(s) Oral daily  citalopram 10 milliGRAM(s) Oral daily  atorvastatin 20 milliGRAM(s) Oral at bedtime  pantoprazole    Tablet 40 milliGRAM(s) Oral before breakfast  levothyroxine 112 MICROGram(s) Oral daily  folic acid 1 milliGRAM(s) Oral daily  insulin glargine Injectable (LANTUS) 22 Unit(s) SubCutaneous at bedtime  insulin lispro (HumaLOG) corrective regimen sliding scale   SubCutaneous three times a day before meals  dextrose 5%. 1000 milliLiter(s) (50 mL/Hr) IV Continuous <Continuous>  dextrose 50% Injectable 12.5 Gram(s) IV Push once  dextrose 50% Injectable 25 Gram(s) IV Push once  dextrose 50% Injectable 25 Gram(s) IV Push once  sodium chloride 0.9%. 1000 milliLiter(s) (50 mL/Hr) IV Continuous <Continuous>  influenza   Vaccine 0.5 milliLiter(s) IntraMuscular once  heparin  Injectable 5000 Unit(s) SubCutaneous every 12 hours      Labs: All Labs Reviewed:                        8.9    8.9   )-----------( 185      ( 28 Sep 2017 04:30 )             27.1         140  |  104  |  71<H>  ----------------------------<  159<H>  5.3   |  32<H>  |  2.59<H>    Ca    8.6      28 Sep 2017 04:30    TPro  7.0  /  Alb  3.4  /  TBili  0.4  /  DBili  x   /  AST  14<L>  /  ALT  15  /  AlkPhos  109      PT/INR - ( 28 Sep 2017 04:30 )   PT: 12.8 sec;   INR: 1.18 ratio         PTT - ( 28 Sep 2017 04:30 )  PTT:28.6 sec      Blood Culture:     RADIOLOGY:      EXAM:  XR KNEE 3 VIEWS LT                          EXAM:  XR FEMUR 2 VIEWS LT                          EXAM:  XR HIP INCL PELV 2-3V LT                          EXAM:  CHEST SINGLE VIEW FRONTAL                            PROCEDURE DATE:  2017        INTERPRETATION:  Clinical information: Fall. Left hip pain.    AP view of the chest  AP view of the pelvis; 2 views of the left hip  AP and lateral views of the left femur  AP and lateral views of the left knee    COMPARISON: Chest x-ray 2017. Pelvis x-ray 2017.    FINDINGS:  Chest: Cardiac, hilar and mediastinal contours are not well   evaluated due to AP technique. There are prominent and indistinct   bronchovascular markings bilaterally compatible with mild pulmonary   vascular congestion. There is no pneumothorax or pleural effusion. No   fractures are identified.    Pelvis/left hip/left femur/left knee:  There is a comminuted   intertrochanteric fracture of the proximal femur with mild medial   displacement of the distal fracture fragments. The distal femur is   intact. The patient is status post left total replacement.    IMPRESSION:    Chest: Mild pulmonary vascular congestion  Pelvis/left hip/left femur/left knee: Comminuted intertrochanteric   fracture.                ROSARIO JUÁREZ   This document has been electronically signed. Sep 28 2017  8:50AM          Assessment/Plan: 1. Left hip fracture following mechanical fall.      2. COPD/emphysema.     3. Hx of lung cancer - S/P lobectomy.    4. Chronic diastolic heart failure.     5. D.M.     PLAN: Although patient is at increased risk for developing post-op pulmonary complications because of chronic lung disease, my impression is that she is in her best possible condition from a pulmonary perspective to proceed with left hip surgery as planned. I would suggest that her respiratory status be monitored closely during the perioperative period. I would suggest continued treatment with inhaled steroids and bronchodilators. Continue O2 supplement; continuous O2 sat monitoring post-op. DVT prophylaxis per ortho. Management of CHF per cardiology and Dr. Phelps.     D/W family and with Dr. Phelps.     Time Span: 60 minutes

## 2020-08-04 NOTE — PROGRESS NOTES
[facility] Hospitalist Progress Note     PCP: Richelle Barrios MD    SUBJECTIVE:  No CP, SOB, or N/V. Pt feels weak.     OBJECTIVE:  Temp:  [97.6 °F (36.4 °C)-98.3 °F (36.8 °C)] 98 °F (36.7 °C)  Pulse:  [56-79] 64  Resp:  [17-20] 17  BP: (106-137)/(55-75) 132/69 aorta diameter was 3.6 -3.7 cm.  4.  Mitral valve: There was mild regurgitation. 5.  Left atrium: The left atrium was moderately dilated. The left atrial      volume was moderately to markedly increased. 6.  Right ventricle:  The cavity size was mildly in pulm artery HTN and RV systolic dysfunction - defer management to cardiology  -cardiology following     Elevated AST  -check LFTs in am     Rhabdomyolysis  -gentle IVF given underlying cardiac issues  -repeat CK has downtrended     CAD, HTN  -hold lasix gi

## 2020-08-04 NOTE — PROGRESS NOTES
[facility]  Cardiology Progress Note    aKlani Velásquez Patient Status:  Observation    1929 MRN [id number]   [facility] 8NE-A Attending Matthew Boogie,    Hosp Day # 0 PCP Kmi Cristobal MD     Subjective:  No chest pain.  C/o kn vessel was mildly dilated. 11. Pericardium, extracardiac: A trivial pericardial effusion was      identified.     Impressions:   This study is compared with previous dated 11/08/2013:  Compared to the previous study, these findings represent indicate worse bedside. Family still deciding if will follow-up with cardiologist at [facility], Dr. Raffy Perez, or Dr. Sedrick Devi upon discharge. Ok to discharge to next level of care from cardiac standpoint.     Loy Livingston MD

## 2020-08-04 NOTE — PHYSICAL THERAPY NOTE
PHYSICAL THERAPY EVALUATION - INPATIENT     Room Number: 2035/2692-W  Evaluation Date: 8/4/2020  Type of Evaluation: Initial  Physician Order: PT Eval and Treat    Presenting Problem: falls, elevated troponin, rhabdomyolysis  Reason for Therapy: Dory Mago, ambulating with RW, lives alone.  He state his 3 daughters check in on  him    SUBJECTIVE  Pt is very [address], cooperative and pleasant    Patient self-stated goal is not stated    OBJECTIVE  Precautions: Cardiac;Bed/chair alarm;Hard of hearing  Fall assistance  Distance (ft): 20  Assistive Device: Rolling walker  Pattern: Shuffle          Skilled Therapy Provided: Pt performed supine to sit with min A. Pt performed sit to stand with mod A. Pt ambulated to bathroom with RW min A.  Pt experienced episod Recommendations: Sub-acute rehabilitation    PLAN  PT Treatment Plan: Bed mobility; Endurance; Patient education; Family education;Gait training;Strengthening;Transfer training  Rehab Potential : Fair  Frequency (Obs): 5x/week  Number of Visits to Meet Establ

## 2020-08-04 NOTE — PLAN OF CARE
A fib controlled rate  deny pain  Dr. Alexx Gunter aware w/ all lab results done this morning; thyroid med adjusted  Patient confused/dis-oriented  follows commands  Fall precautions; bed and chair alarms in use  Patient does not remember to call prior to gettin intact  Description  INTERVENTIONS  - Assess and document risk factors for pressure ulcer development  - Assess and document skin integrity  - Monitor for areas of redness and/or skin breakdown  - Initiate interventions, skin care algorithm/standards of ca

## 2020-08-05 LAB
BASOPHILS # BLD AUTO: 0.04 X10(3) UL (ref 0–0.2)
BASOPHILS NFR BLD AUTO: 0.5 %
DEPRECATED RDW RBC AUTO: 59.7 FL (ref 35.1–46.3)
EOSINOPHIL # BLD AUTO: 0.06 X10(3) UL (ref 0–0.7)
EOSINOPHIL NFR BLD AUTO: 0.8 %
ERYTHROCYTE [DISTWIDTH] IN BLOOD BY AUTOMATED COUNT: 21.5 % (ref 11–15)
HCT VFR BLD AUTO: 30.4 % (ref 39–53)
HGB BLD-MCNC: 8.8 G/DL (ref 13–17.5)
IMM GRANULOCYTES # BLD AUTO: 0.02 X10(3) UL (ref 0–1)
IMM GRANULOCYTES NFR BLD: 0.3 %
LYMPHOCYTES # BLD AUTO: 2.01 X10(3) UL (ref 1–4)
LYMPHOCYTES NFR BLD AUTO: 27.1 %
MCH RBC QN AUTO: 22.7 PG (ref 26–34)
MCHC RBC AUTO-ENTMCNC: 28.9 G/DL (ref 31–37)
MCV RBC AUTO: 78.4 FL (ref 80–100)
MONOCYTES # BLD AUTO: 0.56 X10(3) UL (ref 0.1–1)
MONOCYTES NFR BLD AUTO: 7.5 %
NEUTROPHILS # BLD AUTO: 4.74 X10 (3) UL (ref 1.5–7.7)
NEUTROPHILS # BLD AUTO: 4.74 X10(3) UL (ref 1.5–7.7)
NEUTROPHILS NFR BLD AUTO: 63.8 %
PLATELET # BLD AUTO: 236 10(3)UL (ref 150–450)
POTASSIUM SERPL-SCNC: 4.3 MMOL/L (ref 3.5–5.1)
RBC # BLD AUTO: 3.88 X10(6)UL (ref 3.8–5.8)
WBC # BLD AUTO: 7.4 X10(3) UL (ref 4–11)

## 2020-08-05 PROCEDURE — 85025 COMPLETE CBC W/AUTO DIFF WBC: CPT | Performed by: HOSPITALIST

## 2020-08-05 PROCEDURE — 99214 OFFICE O/P EST MOD 30 MIN: CPT | Performed by: NURSE PRACTITIONER

## 2020-08-05 PROCEDURE — 84132 ASSAY OF SERUM POTASSIUM: CPT | Performed by: HOSPITALIST

## 2020-08-05 NOTE — PROGRESS NOTES
[facility] Hospitalist Progress Note     PCP: Jayce Dennis MD    SUBJECTIVE:  No CP, SOB, or N/V.    OBJECTIVE:  Temp:  [97.3 °F (36.3 °C)-98.3 °F (36.8 °C)] 98.3 °F (36.8 °C)  Pulse:  [63-72] 63  Resp:  [18] 18  BP: (115-140)/(66-82) 140/81    Intake/Output: rhabdomyolysis    Fall, initial encounter    Confusion    Patient is a 80year old male with PMH sig for CAD, A fib, HTN, HL and hypothyroidism is admitted after 2 falls that occurred Saturday night and then Sunday night.     Mechanical fall, generalized w Hospitalist  Pager: [phone]  Answering Service: [phone]

## 2020-08-05 NOTE — PROGRESS NOTES
[facility]  Cardiology Progress Note    Alejandro De Dios Patient Status:  Observation    1929 MRN [id number]   [facility] 8NE-A Attending Mikaela Samaniego,    Hosp Day # 0 PCP Shalonda Carrera MD     Subjective:  Complains of right sh

## 2020-08-05 NOTE — PLAN OF CARE
Received patient, alert andd confused, trying to get up and looking for his wife. Reoriented and redirected. Settled for a while then back again at looking for his wife. Discussed POC. Due meds given.  Safety measures reinforced, call light within reach, indicated by assessment.  - Educate pt/family on patient safety including physical limitations  - Instruct pt to call for assistance with activity based on assessment  - Modify environment to reduce risk of injury  - Provide assistive devices as appropriat

## 2020-08-06 LAB
ANION GAP SERPL CALC-SCNC: 3 MMOL/L (ref 0–18)
BUN BLD-MCNC: 20 MG/DL (ref 7–18)
BUN/CREAT SERPL: 23.5 (ref 10–20)
CALCIUM BLD-MCNC: 8.3 MG/DL (ref 8.5–10.1)
CHLORIDE SERPL-SCNC: 109 MMOL/L (ref 98–112)
CO2 SERPL-SCNC: 29 MMOL/L (ref 21–32)
CREAT BLD-MCNC: 0.85 MG/DL (ref 0.7–1.3)
DEPRECATED RDW RBC AUTO: 58.3 FL (ref 35.1–46.3)
ERYTHROCYTE [DISTWIDTH] IN BLOOD BY AUTOMATED COUNT: 21.5 % (ref 11–15)
GLUCOSE BLD-MCNC: 96 MG/DL (ref 70–99)
HCT VFR BLD AUTO: 27 % (ref 39–53)
HGB BLD-MCNC: 8 G/DL (ref 13–17.5)
MCH RBC QN AUTO: 22.7 PG (ref 26–34)
MCHC RBC AUTO-ENTMCNC: 29.6 G/DL (ref 31–37)
MCV RBC AUTO: 76.7 FL (ref 80–100)
OSMOLALITY SERPL CALC.SUM OF ELEC: 294 MOSM/KG (ref 275–295)
PLATELET # BLD AUTO: 228 10(3)UL (ref 150–450)
POTASSIUM SERPL-SCNC: 4.2 MMOL/L (ref 3.5–5.1)
RBC # BLD AUTO: 3.52 X10(6)UL (ref 3.8–5.8)
SODIUM SERPL-SCNC: 141 MMOL/L (ref 136–145)
WBC # BLD AUTO: 7.1 X10(3) UL (ref 4–11)

## 2020-08-06 PROCEDURE — 96376 TX/PRO/DX INJ SAME DRUG ADON: CPT

## 2020-08-06 PROCEDURE — 87086 URINE CULTURE/COLONY COUNT: CPT | Performed by: EMERGENCY MEDICINE

## 2020-08-06 PROCEDURE — 85027 COMPLETE CBC AUTOMATED: CPT | Performed by: HOSPITALIST

## 2020-08-06 PROCEDURE — 80048 BASIC METABOLIC PNL TOTAL CA: CPT | Performed by: HOSPITALIST

## 2020-08-06 RX ORDER — HYDROCORTISONE 0.5 G/100G
CREAM TOPICAL 2 TIMES DAILY
Status: DISCONTINUED | OUTPATIENT
Start: 2020-08-06 | End: 2020-08-10

## 2020-08-06 NOTE — PROGRESS NOTES
[facility] Hospitalist Progress Note     PCP: Amalia Zuluaga MD    SUBJECTIVE:  No CP, SOB, or N/V.    OBJECTIVE:  Temp:  [97.3 °F (36.3 °C)-98.2 °F (36.8 °C)] 97.4 °F (36.3 °C)  Pulse:  [64-81] 64  Resp:  [18-20] 18  BP: ()/(55-65) 119/64    Intake/Output: Nightly       acetaminophen, docusate sodium, psyllium, PEG 3350       Assessment/Plan:     Principal Problem:    Elevated troponin  Active Problems:    Non-traumatic rhabdomyolysis    Fall, initial encounter    Confusion      Patient is a 80year old male patient and coordinating care:  25 minutes    Cornelia Flores [facility] Hospitalist  Pager: [phone]  Answering Service: [phone] No

## 2020-08-06 NOTE — PROGRESS NOTES
08/06/20 8729   Clinical Encounter Type   Visited With Health care provider  (Nurse indicated that patient has some dementia.)   Routine Visit Follow-up   Continue Visiting Yes  (Follow up regarding POLST.   POLST is not completed.)   Patient Spiritual E

## 2020-08-06 NOTE — PROGRESS NOTES
08/06/20 8150   Clinical Encounter Type   Visited With Patient not available   Routine Visit Follow-up   Continue Visiting Yes  ( should follow up for prayer with patient.)

## 2020-08-06 NOTE — PLAN OF CARE
Assumed care for pt at 0700  Alert to self and place-answers questions appropriately. Slow to respond-allow extra time. Up with 1 and walker. Bed alarm on. AFIB on cardiac monitor. Adequate saturation on RA. Lung sounds diminished.  Denies SOB, chest discom antiarrhythmic and heart rate control medications as ordered  - Initiate emergency measures for life threatening arrhythmias  - Monitor electrolytes and administer replacement therapy as ordered  Outcome: Progressing     Problem: Impaired Functional Mobili tolerated functional activity level and precautions during self-care     Outcome: Progressing

## 2020-08-06 NOTE — PLAN OF CARE
Assumed care of patient at [address]. Patient resting in bed, AOX4, occasionally confused at baseline. Oxygenating >90% on RA. A-fib on tele. No anti-coagulation. SCDs in place. VSS. PRN Tylenol for pain. High fall risk- fall precautions in place.  Urine culture Monitor electrolytes and administer replacement therapy as ordered  Outcome: Progressing     Problem: Impaired Functional Mobility  Goal: Achieve highest/safest level of mobility/gait  Description  Interventions:  - Assess patient's functional ability and

## 2020-08-07 PROCEDURE — 97535 SELF CARE MNGMENT TRAINING: CPT

## 2020-08-07 PROCEDURE — 97116 GAIT TRAINING THERAPY: CPT

## 2020-08-07 PROCEDURE — 97530 THERAPEUTIC ACTIVITIES: CPT

## 2020-08-07 RX ORDER — LEVOTHYROXINE SODIUM 0.12 MG/1
125 TABLET ORAL
Qty: 30 TABLET | Refills: 0 | Status: SHIPPED | OUTPATIENT
Start: 2020-08-08 | End: 2020-09-01

## 2020-08-07 RX ORDER — PANTOPRAZOLE SODIUM 40 MG/1
40 TABLET, DELAYED RELEASE ORAL
Qty: 30 TABLET | Refills: 0 | Status: SHIPPED | OUTPATIENT
Start: 2020-08-07 | End: 2020-10-18

## 2020-08-07 NOTE — PROGRESS NOTES
08/07/20 0835   Clinical Encounter Type   Visited With Health care provider;Patient not available   Routine Visit Follow-up   Continue Visiting Yes  (Nurse to page  at 2000 after patient meets with the doctor regaring code status and POLST)

## 2020-08-07 NOTE — PROGRESS NOTES
08/07/20 1814   Clinical Encounter Type   Visited With Health care provider  (ANA Frank)   Routine Visit Follow-up  (Per RN POLST has not been completed and patient is being treated as Full Code)   1:1 interaction with patient's RN.   noted sheri

## 2020-08-07 NOTE — PLAN OF CARE
Assumed patient care at 0730 this AM. Patient A&O x2-3, confused/forgetful at baseline. SPO2 maintained on RA, no c/o SOB. Afib on tele, rates controlled, no AC. Pt is incontinent of urine, condom cath in place draining izzy-yellow urine.  Up with one assi monitoring, monitor vital signs, obtain 12 lead EKG if indicated  - Evaluate effectiveness of antiarrhythmic and heart rate control medications as ordered  - Initiate emergency measures for life threatening arrhythmias  - Monitor electrolytes and administe Monitor for areas of redness and/or skin breakdown  - Initiate interventions, skin care algorithm/standards of care as needed  Outcome: Progressing

## 2020-08-07 NOTE — OCCUPATIONAL THERAPY NOTE
OCCUPATIONAL THERAPY TREATMENT NOTE - INPATIENT     Room Number: 4364/2219-Y  Session: 1   Number of Visits to Meet Established Goals: 5    Presenting Problem: falls, elevated troponin, non-traumatic rhabdomyolysis    History related to current admission: ACTIVITIES OF DAILY LIVING ASSESSMENT  AM-PAC ‘6-Clicks’ Inpatient Daily Activity Short Form  How much help from another person does the patient currently need…  -   Putting on and taking off regular lower body clothing?: A Lot  -   Bathing (incl benefit from skilled inpatient OT to address the above deficits, maximizing patient’s ability to return safely to his prior level of function.       OT Discharge Recommendations: Sub-acute rehabilitation(12 to 14 days)       PLAN  OT Treatment Plan: Balance

## 2020-08-07 NOTE — PROGRESS NOTES
[facility] Hospitalist Progress Note     PCP: Shruthi Shukla MD    SUBJECTIVE:  No CP, SOB, or N/V.    OBJECTIVE:  Temp:  [97.3 °F (36.3 °C)-98.6 °F (37 °C)] 98.6 °F (37 °C)  Pulse:  [57-68] 57  Resp:  [18-20] 18  BP: (113-127)/(61-87) 122/68    Intake/Output: PARoxetine HCl  10 mg Oral Daily   • traZODone HCl  50 mg Oral Nightly       acetaminophen, docusate sodium, psyllium, PEG 3350       Assessment/Plan:     Principal Problem:    Elevated troponin  Active Problems:    Non-traumatic rhabdomyolysis    Fall, in trazodone     Dispo - monitor on tele  -awaiting MARLEE placement       Questions/concerns were discussed with patient and/or family by bedside.     Total Time spent with patient and coordinating care:  25 minutes    Thank You,  Ashley Ramey DO  [facility]

## 2020-08-07 NOTE — CM/SW NOTE
Spoke with daughters and pt, they are agreeable to [facility]. Insurance Narciso Richards was received at [facility], requested that they release the Terryann Rinks. Spoke to [place] at [facility] AT [place], she stated that she will retrieve the authorization. Hopeful for discharge today.  R

## 2020-08-07 NOTE — CM/SW NOTE
Insurance denied authorization for [employer]. A peer to peer was completed this morning, however it was still denied. SW resubmitted MARLEE referrals in [facility]. Will follow up with daughter on MARLEE placement today.

## 2020-08-07 NOTE — PHYSICAL THERAPY NOTE
PHYSICAL THERAPY TREATMENT NOTE - INPATIENT    Room Number: 1310/7051-W     Session: 1   Number of Visits to Meet Established Goals: 5    Presenting Problem: falls, elevated troponin, rhabdomyolysis     History related to current admission: Pt admitted s/ techniques;Relaxation;Repositioning    BALANCE                                                                                                                     Static Sitting: Fair -  Dynamic Sitting: Poor +           Static Standing: Poor +  Dynamic St old male admitted on 8/3/2020 for fall x 2. Pertinent comorbidities and personal factors impacting therapy include A-fib, CAD, dementia, HTN, hypothyroidism.  The patient presents with the following impairments decreased strength, balance, endurance, safet

## 2020-08-07 NOTE — PROGRESS NOTES
Pt. Alert and o x 2-3 , on RA. Not in any form of distress. Tele shows Afib on the monitor HR at 50's to 60's. Denies any pain or discomfort. Cont. Monitor per tele/labs/v/s. Meds as ordered.  Fall/safety precautions instructed , placed call light

## 2020-08-08 LAB
ANION GAP SERPL CALC-SCNC: 1 MMOL/L (ref 0–18)
BASOPHILS # BLD AUTO: 0.07 X10(3) UL (ref 0–0.2)
BASOPHILS NFR BLD AUTO: 0.9 %
BUN BLD-MCNC: 17 MG/DL (ref 7–18)
BUN/CREAT SERPL: 20 (ref 10–20)
CALCIUM BLD-MCNC: 8.6 MG/DL (ref 8.5–10.1)
CHLORIDE SERPL-SCNC: 108 MMOL/L (ref 98–112)
CK SERPL-CCNC: 56 U/L (ref 39–308)
CO2 SERPL-SCNC: 30 MMOL/L (ref 21–32)
CREAT BLD-MCNC: 0.85 MG/DL (ref 0.7–1.3)
DEPRECATED RDW RBC AUTO: 59.4 FL (ref 35.1–46.3)
EOSINOPHIL # BLD AUTO: 0.18 X10(3) UL (ref 0–0.7)
EOSINOPHIL NFR BLD AUTO: 2.3 %
ERYTHROCYTE [DISTWIDTH] IN BLOOD BY AUTOMATED COUNT: 22.5 % (ref 11–15)
GLUCOSE BLD-MCNC: 98 MG/DL (ref 70–99)
HCT VFR BLD AUTO: 29.2 % (ref 39–53)
HGB BLD-MCNC: 8.7 G/DL (ref 13–17.5)
IMM GRANULOCYTES # BLD AUTO: 0.04 X10(3) UL (ref 0–1)
IMM GRANULOCYTES NFR BLD: 0.5 %
LYMPHOCYTES # BLD AUTO: 2.22 X10(3) UL (ref 1–4)
LYMPHOCYTES NFR BLD AUTO: 29 %
MCH RBC QN AUTO: 23.5 PG (ref 26–34)
MCHC RBC AUTO-ENTMCNC: 29.8 G/DL (ref 31–37)
MCV RBC AUTO: 78.9 FL (ref 80–100)
MONOCYTES # BLD AUTO: 0.86 X10(3) UL (ref 0.1–1)
MONOCYTES NFR BLD AUTO: 11.2 %
NEUTROPHILS # BLD AUTO: 4.29 X10 (3) UL (ref 1.5–7.7)
NEUTROPHILS # BLD AUTO: 4.29 X10(3) UL (ref 1.5–7.7)
NEUTROPHILS NFR BLD AUTO: 56.1 %
OSMOLALITY SERPL CALC.SUM OF ELEC: 290 MOSM/KG (ref 275–295)
PLATELET # BLD AUTO: 229 10(3)UL (ref 150–450)
PLATELET MORPHOLOGY: NORMAL
POTASSIUM SERPL-SCNC: 4.1 MMOL/L (ref 3.5–5.1)
RBC # BLD AUTO: 3.7 X10(6)UL (ref 3.8–5.8)
SODIUM SERPL-SCNC: 139 MMOL/L (ref 136–145)
WBC # BLD AUTO: 7.7 X10(3) UL (ref 4–11)

## 2020-08-08 PROCEDURE — 96376 TX/PRO/DX INJ SAME DRUG ADON: CPT

## 2020-08-08 PROCEDURE — 82550 ASSAY OF CK (CPK): CPT | Performed by: HOSPITALIST

## 2020-08-08 PROCEDURE — 85025 COMPLETE CBC W/AUTO DIFF WBC: CPT | Performed by: HOSPITALIST

## 2020-08-08 PROCEDURE — 80048 BASIC METABOLIC PNL TOTAL CA: CPT | Performed by: HOSPITALIST

## 2020-08-08 NOTE — PROGRESS NOTES
[facility] hospitalist daily note  Patient was seen/examined on 8/8/20    S; no chest pain, breathing ok, no abd pain, no nausea  Denies bleeding    medicatins in Epic    PE    08/08/20  1000   BP:    Pulse: 66   Resp:    Temp:      Gen: awake, alert, no respirat hemoglobin has now decreased from baseline     Depression  meds ordered     Dispo   -awaiting MARLEE placement    Sanam Bazan MD  [facility] hospitalist  [phone]

## 2020-08-08 NOTE — PROGRESS NOTES
Pt. Alert and o x 2-3 , on RA , breathing pattern easy and non labored. Tele shows Afib on the monitor. Denies any pain or discomfort. Cont. Monitor per tele/labs/v/s. Meds as ordered.  Fall/safety precautions instructed , placed call light w/in alessandra

## 2020-08-08 NOTE — PLAN OF CARE
Ready for discharge today pending insurance auth for johanny manning  Up with assistance and walker  Glasses  No anticoags  Scds  Voids per urinal

## 2020-08-08 NOTE — CM/SW NOTE
Message left for [facility] in admissions at [facility] regarding status of insurance auth--await call back  ([facility] [phone])

## 2020-08-09 LAB
BASOPHILS # BLD AUTO: 0.06 X10(3) UL (ref 0–0.2)
BASOPHILS NFR BLD AUTO: 0.6 %
DEPRECATED RDW RBC AUTO: 60.7 FL (ref 35.1–46.3)
EOSINOPHIL # BLD AUTO: 0.16 X10(3) UL (ref 0–0.7)
EOSINOPHIL NFR BLD AUTO: 1.7 %
ERYTHROCYTE [DISTWIDTH] IN BLOOD BY AUTOMATED COUNT: 23.5 % (ref 11–15)
HCT VFR BLD AUTO: 31.9 % (ref 39–53)
HGB BLD-MCNC: 9.6 G/DL (ref 13–17.5)
IMM GRANULOCYTES # BLD AUTO: 0.03 X10(3) UL (ref 0–1)
IMM GRANULOCYTES NFR BLD: 0.3 %
LYMPHOCYTES # BLD AUTO: 2.45 X10(3) UL (ref 1–4)
LYMPHOCYTES NFR BLD AUTO: 25.8 %
MCH RBC QN AUTO: 24.1 PG (ref 26–34)
MCHC RBC AUTO-ENTMCNC: 30.1 G/DL (ref 31–37)
MCV RBC AUTO: 80.2 FL (ref 80–100)
MONOCYTES # BLD AUTO: 0.75 X10(3) UL (ref 0.1–1)
MONOCYTES NFR BLD AUTO: 7.9 %
NEUTROPHILS # BLD AUTO: 6.04 X10 (3) UL (ref 1.5–7.7)
NEUTROPHILS # BLD AUTO: 6.04 X10(3) UL (ref 1.5–7.7)
NEUTROPHILS NFR BLD AUTO: 63.7 %
PLATELET # BLD AUTO: 243 10(3)UL (ref 150–450)
RBC # BLD AUTO: 3.98 X10(6)UL (ref 3.8–5.8)
WBC # BLD AUTO: 9.5 X10(3) UL (ref 4–11)

## 2020-08-09 PROCEDURE — 85025 COMPLETE CBC W/AUTO DIFF WBC: CPT | Performed by: HOSPITALIST

## 2020-08-09 RX ORDER — DOCUSATE SODIUM 100 MG/1
100 CAPSULE, LIQUID FILLED ORAL 2 TIMES DAILY
Status: DISCONTINUED | OUTPATIENT
Start: 2020-08-09 | End: 2020-08-10

## 2020-08-09 RX ORDER — BISACODYL 10 MG
10 SUPPOSITORY, RECTAL RECTAL
Status: DISCONTINUED | OUTPATIENT
Start: 2020-08-09 | End: 2020-08-10

## 2020-08-09 NOTE — PROGRESS NOTES
[facility] hospitalist daily note  Patient was seen/examined on 8/9/20     S; no chest pain, breathing ok, no abd pain, no nausea  Denies bleeding  Per pt he feels constipated.  Per pt he got suppository  + passing gas and urinating     medicatins in Epic     PE 9 venofer  -started PPI   -discussed need for f/u with Dr. Sheree Cooley as outpt. Shanon Solis were not pursuing c scope as outpt due to age but his hemoglobin has now decreased from baseline  Hemoglobin improved     Depression  meds ordered    Constipation meds ordered

## 2020-08-09 NOTE — PLAN OF CARE
Couple days with no BM suppository given today  Polst form on chart to be filled out  Awaiting to hear from johanny mannnig for acceptance  Discharge today or tomorrow pending insurance  Up with assist  Follow up cardiology as OP

## 2020-08-09 NOTE — CM/SW NOTE
sAa Sandra 93 to find out if they received insurance auth for pt - the  was not sure but will check and call back.

## 2020-08-09 NOTE — PROGRESS NOTES
Pt. Alert and ox 2-3 , on RA , not in any form of distress. C/o constipation , Last BM was 8/4 ; Miralax and Colace given. Tele shows Afib on the monitor. Denies any pain or discomfort. Cont. Monitor per tele/labs/v/s.  Fall/safety precautions inst

## 2020-08-10 VITALS
TEMPERATURE: 98 F | SYSTOLIC BLOOD PRESSURE: 108 MMHG | OXYGEN SATURATION: 99 % | BODY MASS INDEX: 21.11 KG/M2 | HEART RATE: 73 BPM | DIASTOLIC BLOOD PRESSURE: 78 MMHG | WEIGHT: 134.5 LBS | RESPIRATION RATE: 20 BRPM | HEIGHT: 67 IN

## 2020-08-10 LAB
BASOPHILS # BLD AUTO: 0.05 X10(3) UL (ref 0–0.2)
BASOPHILS NFR BLD AUTO: 0.5 %
DEPRECATED RDW RBC AUTO: 59.3 FL (ref 35.1–46.3)
EOSINOPHIL # BLD AUTO: 0.12 X10(3) UL (ref 0–0.7)
EOSINOPHIL NFR BLD AUTO: 1.1 %
ERYTHROCYTE [DISTWIDTH] IN BLOOD BY AUTOMATED COUNT: 23.9 % (ref 11–15)
HCT VFR BLD AUTO: 30.7 % (ref 39–53)
HGB BLD-MCNC: 9.3 G/DL (ref 13–17.5)
IMM GRANULOCYTES # BLD AUTO: 0.04 X10(3) UL (ref 0–1)
IMM GRANULOCYTES NFR BLD: 0.4 %
LYMPHOCYTES # BLD AUTO: 2.35 X10(3) UL (ref 1–4)
LYMPHOCYTES NFR BLD AUTO: 21.5 %
MCH RBC QN AUTO: 23.6 PG (ref 26–34)
MCHC RBC AUTO-ENTMCNC: 30.3 G/DL (ref 31–37)
MCV RBC AUTO: 77.9 FL (ref 80–100)
MONOCYTES # BLD AUTO: 1.05 X10(3) UL (ref 0.1–1)
MONOCYTES NFR BLD AUTO: 9.6 %
NEUTROPHILS # BLD AUTO: 7.3 X10 (3) UL (ref 1.5–7.7)
NEUTROPHILS # BLD AUTO: 7.3 X10(3) UL (ref 1.5–7.7)
NEUTROPHILS NFR BLD AUTO: 66.9 %
PLATELET # BLD AUTO: 237 10(3)UL (ref 150–450)
RBC # BLD AUTO: 3.94 X10(6)UL (ref 3.8–5.8)
WBC # BLD AUTO: 10.9 X10(3) UL (ref 4–11)

## 2020-08-10 PROCEDURE — 85025 COMPLETE CBC W/AUTO DIFF WBC: CPT | Performed by: HOSPITALIST

## 2020-08-10 NOTE — CM/SW NOTE
Received insurance authorization for patient to go to [facility]. [address] transportation on will call.   ([phone])  Nurse to send d/c papers and call report to [phone]

## 2020-08-10 NOTE — PROGRESS NOTES
Pt. Alert and o x 2-3 . Tele shows Afib on the monitor. Denies any pain or discomfort. Awaiting for insurance approval to [facility]. Fall/safety precautions instructed , placed call light w/in reach.

## 2020-08-10 NOTE — PROGRESS NOTES
[facility] hospitalist daily note  Patient was seen/examined on 8/10/20     S; no chest pain, breathing ok, no abd pain, no nausea  Denies bleeding  Per RN had BM after suppository  + passing gas and urinating     medicatins in Epic     PE    08/10/20  1222   BP: have repeat TSH in 4 weeks     Anemia  Denies bleeding  -given downtrend and his generalized weakness/fatigue, given venofer  -started PPI   -discussed need for f/u with Dr. Kylee Jimenez as outpt. Ana María Branham were not pursuing c scope as outpt due to age but his hemo

## 2020-08-10 NOTE — PLAN OF CARE
Neuro: AOx3 (Disoriented to situation). Forgetful. Resp: CTA. Room air. Cardiac: A-fib. Pedal pulses palpable. No edema. GI: Incontinent. : Incontinent. Skin: Coccyx is C/D/I. Mepilex placed prophylactic.     POC: Discharge to [facility] once approved by in and administer replacement therapy as ordered  Outcome: Adequate for Discharge     Problem: Impaired Functional Mobility  Goal: Achieve highest/safest level of mobility/gait  Description  Interventions:  - Assess patient's functional ability and stability

## 2020-08-10 NOTE — CM/SW NOTE
08/10/20 1500   Discharge disposition   Expected discharge disposition Skilled Nurs   Name of Facillity/Home Care/Hospice [facility]   Patient is Discharged to a [address] [place] [address] Yes   Discharge transportation [facility]

## 2020-08-11 ENCOUNTER — INITIAL APN SNF VISIT (OUTPATIENT)
Dept: INTERNAL MEDICINE CLINIC | Age: 85
End: 2020-08-11

## 2020-08-11 VITALS
OXYGEN SATURATION: 97 % | TEMPERATURE: 98 F | RESPIRATION RATE: 20 BRPM | HEART RATE: 63 BPM | SYSTOLIC BLOOD PRESSURE: 116 MMHG | DIASTOLIC BLOOD PRESSURE: 64 MMHG

## 2020-08-11 DIAGNOSIS — Z87.19 HISTORY OF CONSTIPATION: ICD-10-CM

## 2020-08-11 DIAGNOSIS — M25.561 ACUTE PAIN OF RIGHT KNEE: ICD-10-CM

## 2020-08-11 DIAGNOSIS — Z87.39 HISTORY OF RHABDOMYOLYSIS: ICD-10-CM

## 2020-08-11 DIAGNOSIS — Z91.81 HISTORY OF FALLING: Primary | ICD-10-CM

## 2020-08-11 PROCEDURE — 1111F DSCHRG MED/CURRENT MED MERGE: CPT | Performed by: NURSE PRACTITIONER

## 2020-08-11 PROCEDURE — 99310 SBSQ NF CARE HIGH MDM 45: CPT | Performed by: NURSE PRACTITIONER

## 2020-08-11 PROCEDURE — 3074F SYST BP LT 130 MM HG: CPT | Performed by: NURSE PRACTITIONER

## 2020-08-11 PROCEDURE — 3078F DIAST BP <80 MM HG: CPT | Performed by: NURSE PRACTITIONER

## 2020-08-11 NOTE — PROGRESS NOTES
Meghan Salomon  : 1929  Age 80year old  male patient is admitted to Facility: [facility] for [address] [place] [address] date:  8/3/20  Discharge date to [facility]:  8/10/20  ELOS:  12-14 days  Anticipated discharge date:  20 or as per Poncho Pena [facility])     cards: Dr. Ruy Han   • CAD (coronary artery disease)     cards: Dr. Ruy Han   • Coronary atherosclerosis    • Dementia [facility])    • Depression    • Esophageal reflux    • High blood pressure    • High cholesterol    • History of prostate canc Laxative, (CITRUCEL) Oral Powder Take 1 packet by mouth daily. • PEG 3350 (MIRALAX) Oral Powd Pack Take 17 g by mouth daily as needed. • aspirin 81 MG Oral Tab Take 1 tablet by mouth daily.  90 tablet 3   • Docusate Sodium 100 MG Oral Cap Use on weakness r/t recent hospitalization/diagnoses/sequelae; will undergo therapies to rehab and improve strength, endurance and independence with ADLs  EXTREMITIES/VASCULAR:no cyanosis, clubbing or edema  NEUROLOGIC: follows commands  PSYCHIATRIC: alert and or care     DVT Prophylaxis not indicated  Encourage ambulation and participation with therapy    Constipation;  Bowel Management Regimen  Docusate 100mg po BID PRN; titrate to 1 stool every other day or 2 per day  Methylcellulose 1 packet po daily  Miralax 17

## 2020-08-13 ENCOUNTER — SNF VISIT (OUTPATIENT)
Dept: INTERNAL MEDICINE CLINIC | Age: 85
End: 2020-08-13

## 2020-08-13 DIAGNOSIS — Z91.81 HISTORY OF FALLING: Primary | ICD-10-CM

## 2020-08-13 DIAGNOSIS — M25.561 ACUTE PAIN OF RIGHT KNEE: ICD-10-CM

## 2020-08-13 DIAGNOSIS — R13.10 DYSPHAGIA, UNSPECIFIED TYPE: ICD-10-CM

## 2020-08-13 DIAGNOSIS — Z87.39 HISTORY OF RHABDOMYOLYSIS: ICD-10-CM

## 2020-08-13 DIAGNOSIS — Z87.19 HISTORY OF CONSTIPATION: ICD-10-CM

## 2020-08-13 PROCEDURE — 99307 SBSQ NF CARE SF MDM 10: CPT | Performed by: NURSE PRACTITIONER

## 2020-08-13 PROCEDURE — 3074F SYST BP LT 130 MM HG: CPT | Performed by: NURSE PRACTITIONER

## 2020-08-13 PROCEDURE — 3078F DIAST BP <80 MM HG: CPT | Performed by: NURSE PRACTITIONER

## 2020-08-14 VITALS
DIASTOLIC BLOOD PRESSURE: 76 MMHG | SYSTOLIC BLOOD PRESSURE: 122 MMHG | TEMPERATURE: 97 F | RESPIRATION RATE: 20 BRPM | HEART RATE: 70 BPM | OXYGEN SATURATION: 94 %

## 2020-08-14 NOTE — PROGRESS NOTES
Sandrita Perez, 39/1929, 80year old, male    Chief Complaint:  Patient presents with:   Follow - Up  [facility] Admit date:  8/3/20  Discharge date to [facility]:  8/10/20  ELOS:  12-14 days  Anticipated discharge date:  8/24/20 or as per I falling--CT head no acute abnormality  Fall precautions  PT/OT/ST to evaluate and treat  MARLEE team to establish care plan meeting with patient and POA/family as appropriate  MARLEE team/ & discharge planner to assist with establishing safe dischar

## 2020-08-18 NOTE — DISCHARGE SUMMARY
[facility] Internal Medicine Discharge Summary   Patient ID:  Kalani Velásquez  [id number]  14 year old  1/9/1929    Admit date: 8/3/2020    Discharge date and time: 8/10/2020     Attending Physician: No att. providers found     Primary Care Physician: Reta Love sits there saturated for prolonged periods.   He has no chest pain/dyspnea.     Otherwise, pt has no complaints, is pleasant and does have some pain in his R knee    Hospital Course:   Patient is a 80year old male with PMH sig for CAD, A fib, HTN, HL and h MARLEE placement  Discharge when placement available and ok with caridology     Patient instructed to seek medical attention if symptoms return, do not improve or get worse.  Pt expressed understanding     Day of discharge Exam     no chest pain, breathing ok, · Levothyroxine Sodium 125 MCG Tabs  · Pantoprazole Sodium 40 MG Tbec         Consults: IP CONSULT TO CARDIOLOGY  IP CONSULT TO SPIRITUAL CARE  IP CONSULT TO SOCIAL WORK  IP CONSULT TO PHYSICAL MEDICINE REHAB    Radiology: CT brain, CT cervical spine, X-

## 2020-09-17 ENCOUNTER — APPOINTMENT (OUTPATIENT)
Dept: GENERAL RADIOLOGY | Facility: HOSPITAL | Age: 85
End: 2020-09-17
Attending: EMERGENCY MEDICINE
Payer: MEDICARE

## 2020-09-17 ENCOUNTER — HOSPITAL ENCOUNTER (EMERGENCY)
Facility: HOSPITAL | Age: 85
Discharge: HOME | End: 2020-09-17
Attending: EMERGENCY MEDICINE
Payer: MEDICARE

## 2020-09-17 ENCOUNTER — APPOINTMENT (OUTPATIENT)
Dept: CT IMAGING | Facility: HOSPITAL | Age: 85
End: 2020-09-17
Attending: EMERGENCY MEDICINE
Payer: MEDICARE

## 2020-09-17 ENCOUNTER — NURSE ONLY (OUTPATIENT)
Dept: LAB | Age: 85
End: 2020-09-17
Attending: INTERNAL MEDICINE
Payer: MEDICARE

## 2020-09-17 VITALS
HEART RATE: 71 BPM | SYSTOLIC BLOOD PRESSURE: 110 MMHG | TEMPERATURE: 98 F | HEIGHT: 69 IN | OXYGEN SATURATION: 98 % | RESPIRATION RATE: 18 BRPM | WEIGHT: 134.5 LBS | BODY MASS INDEX: 19.92 KG/M2 | DIASTOLIC BLOOD PRESSURE: 70 MMHG

## 2020-09-17 DIAGNOSIS — I10 ESSENTIAL HYPERTENSION, MALIGNANT: Primary | ICD-10-CM

## 2020-09-17 DIAGNOSIS — S22.42XA CLOSED FRACTURE OF MULTIPLE RIBS OF LEFT SIDE, INITIAL ENCOUNTER: Primary | ICD-10-CM

## 2020-09-17 LAB
ALBUMIN SERPL-MCNC: 3.4 G/DL (ref 3.4–5)
ALBUMIN/GLOB SERPL: 1.1 {RATIO} (ref 1–2)
ALP LIVER SERPL-CCNC: 93 U/L (ref 45–117)
ALT SERPL-CCNC: 19 U/L (ref 16–61)
ANION GAP SERPL CALC-SCNC: 4 MMOL/L (ref 0–18)
AST SERPL-CCNC: 20 U/L (ref 15–37)
ATRIAL RATE: 68 BPM
BASOPHILS # BLD AUTO: 0.03 X10(3) UL (ref 0–0.2)
BASOPHILS NFR BLD AUTO: 0.4 %
BILIRUB SERPL-MCNC: 1.2 MG/DL (ref 0.1–2)
BILIRUB UR QL STRIP.AUTO: NEGATIVE
BUN BLD-MCNC: 19 MG/DL (ref 7–18)
BUN/CREAT SERPL: 20.9 (ref 10–20)
CALCIUM BLD-MCNC: 8.8 MG/DL (ref 8.5–10.1)
CHLORIDE SERPL-SCNC: 107 MMOL/L (ref 98–112)
CLARITY UR REFRACT.AUTO: CLEAR
CO2 SERPL-SCNC: 27 MMOL/L (ref 21–32)
COLOR UR AUTO: YELLOW
CREAT BLD-MCNC: 0.91 MG/DL (ref 0.7–1.3)
DEPRECATED RDW RBC AUTO: 73.6 FL (ref 35.1–46.3)
EOSINOPHIL # BLD AUTO: 0.03 X10(3) UL (ref 0–0.7)
EOSINOPHIL NFR BLD AUTO: 0.4 %
ERYTHROCYTE [DISTWIDTH] IN BLOOD BY AUTOMATED COUNT: 23.7 % (ref 11–15)
EST. AVERAGE GLUCOSE BLD GHB EST-MCNC: 105 MG/DL (ref 68–126)
GLOBULIN PLAS-MCNC: 3.1 G/DL (ref 2.8–4.4)
GLUCOSE BLD-MCNC: 90 MG/DL (ref 70–99)
GLUCOSE UR STRIP.AUTO-MCNC: NEGATIVE MG/DL
HBA1C MFR BLD HPLC: 5.3 % (ref ?–5.7)
HCT VFR BLD AUTO: 32.3 % (ref 39–53)
HGB BLD-MCNC: 9.8 G/DL (ref 13–17.5)
IMM GRANULOCYTES # BLD AUTO: 0.02 X10(3) UL (ref 0–1)
IMM GRANULOCYTES NFR BLD: 0.2 %
KETONES UR STRIP.AUTO-MCNC: NEGATIVE MG/DL
LEUKOCYTE ESTERASE UR QL STRIP.AUTO: NEGATIVE
LYMPHOCYTES # BLD AUTO: 1.73 X10(3) UL (ref 1–4)
LYMPHOCYTES NFR BLD AUTO: 21.5 %
M PROTEIN MFR SERPL ELPH: 6.5 G/DL (ref 6.4–8.2)
MCH RBC QN AUTO: 26.3 PG (ref 26–34)
MCHC RBC AUTO-ENTMCNC: 30.3 G/DL (ref 31–37)
MCV RBC AUTO: 86.8 FL (ref 80–100)
MONOCYTES # BLD AUTO: 0.81 X10(3) UL (ref 0.1–1)
MONOCYTES NFR BLD AUTO: 10.1 %
NEUTROPHILS # BLD AUTO: 5.41 X10 (3) UL (ref 1.5–7.7)
NEUTROPHILS # BLD AUTO: 5.41 X10(3) UL (ref 1.5–7.7)
NEUTROPHILS NFR BLD AUTO: 67.4 %
NITRITE UR QL STRIP.AUTO: NEGATIVE
OSMOLALITY SERPL CALC.SUM OF ELEC: 288 MOSM/KG (ref 275–295)
PH UR STRIP.AUTO: 6 [PH] (ref 4.5–8)
PLATELET # BLD AUTO: 185 10(3)UL (ref 150–450)
PLATELET MORPHOLOGY: NORMAL
POTASSIUM SERPL-SCNC: 3.9 MMOL/L (ref 3.5–5.1)
PROT UR STRIP.AUTO-MCNC: NEGATIVE MG/DL
PSA SERPL-MCNC: 0.02 NG/ML (ref ?–4)
Q-T INTERVAL: 438 MS
QRS DURATION: 96 MS
QTC CALCULATION (BEZET): 479 MS
R AXIS: -12 DEGREES
RBC # BLD AUTO: 3.72 X10(6)UL (ref 3.8–5.8)
RBC UR QL AUTO: NEGATIVE
SODIUM SERPL-SCNC: 138 MMOL/L (ref 136–145)
SP GR UR STRIP.AUTO: 1.02 (ref 1–1.03)
T AXIS: 129 DEGREES
T4 FREE SERPL-MCNC: 1.3 NG/DL (ref 0.8–1.7)
TSI SER-ACNC: 7 MIU/ML (ref 0.36–3.74)
UROBILINOGEN UR STRIP.AUTO-MCNC: 2 MG/DL
VENTRICULAR RATE: 72 BPM
WBC # BLD AUTO: 8 X10(3) UL (ref 4–11)

## 2020-09-17 PROCEDURE — 93010 ELECTROCARDIOGRAM REPORT: CPT

## 2020-09-17 PROCEDURE — 84443 ASSAY THYROID STIM HORMONE: CPT | Performed by: EMERGENCY MEDICINE

## 2020-09-17 PROCEDURE — 99285 EMERGENCY DEPT VISIT HI MDM: CPT

## 2020-09-17 PROCEDURE — 96374 THER/PROPH/DIAG INJ IV PUSH: CPT

## 2020-09-17 PROCEDURE — 83036 HEMOGLOBIN GLYCOSYLATED A1C: CPT

## 2020-09-17 PROCEDURE — 97162 PT EVAL MOD COMPLEX 30 MIN: CPT

## 2020-09-17 PROCEDURE — 36415 COLL VENOUS BLD VENIPUNCTURE: CPT

## 2020-09-17 PROCEDURE — 97530 THERAPEUTIC ACTIVITIES: CPT

## 2020-09-17 PROCEDURE — 71101 X-RAY EXAM UNILAT RIBS/CHEST: CPT | Performed by: EMERGENCY MEDICINE

## 2020-09-17 PROCEDURE — 84153 ASSAY OF PSA TOTAL: CPT

## 2020-09-17 PROCEDURE — 80053 COMPREHEN METABOLIC PANEL: CPT | Performed by: EMERGENCY MEDICINE

## 2020-09-17 PROCEDURE — 85025 COMPLETE CBC W/AUTO DIFF WBC: CPT | Performed by: EMERGENCY MEDICINE

## 2020-09-17 PROCEDURE — 97116 GAIT TRAINING THERAPY: CPT

## 2020-09-17 PROCEDURE — 97166 OT EVAL MOD COMPLEX 45 MIN: CPT

## 2020-09-17 PROCEDURE — 70450 CT HEAD/BRAIN W/O DYE: CPT | Performed by: EMERGENCY MEDICINE

## 2020-09-17 PROCEDURE — 93005 ELECTROCARDIOGRAM TRACING: CPT

## 2020-09-17 PROCEDURE — 84439 ASSAY OF FREE THYROXINE: CPT | Performed by: EMERGENCY MEDICINE

## 2020-09-17 PROCEDURE — 81001 URINALYSIS AUTO W/SCOPE: CPT | Performed by: EMERGENCY MEDICINE

## 2020-09-17 RX ORDER — MORPHINE SULFATE 2 MG/ML
2 INJECTION, SOLUTION INTRAMUSCULAR; INTRAVENOUS ONCE
Status: COMPLETED | OUTPATIENT
Start: 2020-09-17 | End: 2020-09-17

## 2020-09-17 RX ORDER — HYDROCODONE BITARTRATE AND ACETAMINOPHEN 5; 325 MG/1; MG/1
1-2 TABLET ORAL EVERY 6 HOURS PRN
Qty: 20 TABLET | Refills: 0 | Status: SHIPPED | OUTPATIENT
Start: 2020-09-17 | End: 2020-10-13

## 2020-09-17 NOTE — ED NOTES
Attempted report to [facility]. Their man number goes to a message saying  is unavailable at this time.

## 2020-09-17 NOTE — ED NOTES
Requested that  call [facility] to make sure patient will be cared for appropriately with his 2 rib fractures.

## 2020-09-17 NOTE — ED PROVIDER NOTES
Patient Seen in: [facility] Emergency Department      History   Patient presents with:  Fall    Stated Complaint: fall    HPI    51-year-old male comes to the hospital of having 4 falls within the last week.   He is complaining of having pain by the noted in HPI. Constitutional and vital signs reviewed. All other systems reviewed and negative except as noted above.     Physical Exam     ED Triage Vitals [09/17/20 0727]   /72   Pulse 71   Resp 16   Temp    Temp src    SpO2 95 %   O2 Device N Status                     ---------                               -----------         ------                     CBC W/ DIFFERENTIAL[[id number]]          Abnormal            Final result                 Please view results for these tests on the indiv Stable CT of the brain. No acute intracranial hemorrhage, mass effect or midline shift. 2. Atrophy and chronic microvascular ischemic changes. 3. Prominent extra-axial CSF density spaces are noted likely reflecting atrophy.   Subdural hygromas are difficul spirometer              Disposition and Plan     Clinical Impression:  Closed fracture of multiple ribs of left side, initial encounter  (primary encounter diagnosis)    Disposition:  There is no disposition on file for this visit.   There is no disposition

## 2020-09-17 NOTE — CM/SW NOTE
Received call from [facility], that insurance Margy Jacobs has been approved.       RN to RN report to Siddharth Almendarez @ [phone] ([phone])    Updated RN and patient

## 2020-09-17 NOTE — PHYSICAL THERAPY NOTE
PHYSICAL THERAPY EVALUATION - INPATIENT     Room Number: B6/B6  Evaluation Date: 9/17/2020  Type of Evaluation: Initial  Physician Order: PT Eval and Treat    Presenting Problem: fall, rib fxs  Reason for Therapy: Mobility Dysfunction and Discharge P RW    SUBJECTIVE  Pt is pleasant, cooperative    Patient self-stated goal is not stated    OBJECTIVE  Precautions: Cardiac;Hard of hearing;Bed/chair alarm  Fall Risk: High fall risk    WEIGHT BEARING RESTRICTION  Weight Bearing Restriction: None CMS Modifier (G-Code): CL    FUNCTIONAL ABILITY STATUS  Gait Assessment   Gait Assistance: Minimum assistance  Distance (ft): 50  Assistive Device: Rolling walker  Pattern: Shuffle          Skilled Therapy Provided: Pt performed supine to sit with mod A mobility, transfers, ambulation with RW. Recommend increased supervision/assistance in the long term secondary to cognitive deficits.      DISCHARGE RECOMMENDATIONS  PT Discharge Recommendations: Sub-acute rehabilitation    PLAN  PT Treatment Plan: Bed mobil

## 2020-09-17 NOTE — CM/SW NOTE
Per [facility], insurance Huan Colon is still pending.   Dee Dee ignacio insurance jewell

## 2020-09-17 NOTE — ED NOTES
Rounded on patient, patient states having left rib pain when trying to cough. Patient's daughter at bedside. Currently no update from [facility] about insurance approval. Will continue to await updates.

## 2020-09-17 NOTE — CM/SW NOTE
LM for [facility] regarding status of insurance approval for SNF    2:20pm - per [employer] is still pending

## 2020-09-17 NOTE — CM/SW NOTE
Spoke with [facility] at [facility], [phone] ([phone]). Patient will need PT and OT eval to get prior auth with his insurance to move to skilled nursing area. PT/OT orders entered.

## 2020-09-17 NOTE — ED INITIAL ASSESSMENT (HPI)
Pt to ED with complaints of left sided hip pain. Per EMS pt has 2 falls one on Tuesday night and one Wednesday morning. Pt found in bed today complaining of pain, increased when moving to stretcher.  Pt was rating 10/10 pain per EMS, fentanyl given during t

## 2020-09-17 NOTE — CM/SW NOTE
Spoke with patient and daughter. LM for [employer] regarding patient needing additional services with rib fracture.

## 2020-09-17 NOTE — ED NOTES
Waiting on insurance approval for patient to be sent to appropriate skilled facility. Family updated.

## 2020-09-17 NOTE — OCCUPATIONAL THERAPY NOTE
OCCUPATIONAL THERAPY EVALUATION - INPATIENT     Room Number: B6/B6  Evaluation Date: 9/17/2020  Type of Evaluation: Initial  Presenting Problem: fall    Physician Order: IP Consult to Occupational Therapy  Reason for Therapy: ADL/IADL Dysfunction and Disch Pt stated, \"It hurts. \"    Patient self-stated goal is to go home     OBJECTIVE     Fall Risk: High fall risk    WEIGHT BEARING RESTRICTION  Weight Bearing Restriction: None                PAIN ASSESSMENT  Ratin  Location: ribs  Management Technique received supine in bed for session, pt t/f to EOB with max assist x2, pt then sat with CGA, therapist completed MMT and ROM on pt, pt was able to amb x1 in room and hallway with min assist to stand and min assist to amb with RW, pt required max assist to d training;IADL training;Continued evaluation; Compensatory technique education;Equipment eval/education;Patient/Family training;Patient/Family education; Endurance training;UE strengthening/ROM; Functional transfer training  Rehab Potential : Good  Frequency (

## 2021-06-05 ENCOUNTER — HOSPITAL ENCOUNTER (OUTPATIENT)
Facility: HOSPITAL | Age: 86
Setting detail: OBSERVATION
Discharge: NURSING, REHAB OR LONG-TERM CARE | End: 2021-06-07
Attending: EMERGENCY MEDICINE | Admitting: INTERNAL MEDICINE
Payer: MEDICARE

## 2021-06-05 ENCOUNTER — APPOINTMENT (OUTPATIENT)
Dept: CT IMAGING | Facility: HOSPITAL | Age: 86
End: 2021-06-05
Attending: EMERGENCY MEDICINE
Payer: MEDICARE

## 2021-06-05 ENCOUNTER — APPOINTMENT (OUTPATIENT)
Dept: GENERAL RADIOLOGY | Facility: HOSPITAL | Age: 86
End: 2021-06-05
Attending: EMERGENCY MEDICINE
Payer: MEDICARE

## 2021-06-05 ENCOUNTER — APPOINTMENT (OUTPATIENT)
Dept: CV DIAGNOSTICS | Facility: HOSPITAL | Age: 86
End: 2021-06-05
Attending: INTERNAL MEDICINE
Payer: MEDICARE

## 2021-06-05 DIAGNOSIS — R07.9 CHEST PAIN, RULE OUT ACUTE MYOCARDIAL INFARCTION: Primary | ICD-10-CM

## 2021-06-05 DIAGNOSIS — R77.8 TROPONIN LEVEL ELEVATED: ICD-10-CM

## 2021-06-05 DIAGNOSIS — E86.0 DEHYDRATION: ICD-10-CM

## 2021-06-05 PROCEDURE — 93306 TTE W/DOPPLER COMPLETE: CPT | Performed by: INTERNAL MEDICINE

## 2021-06-05 PROCEDURE — 99214 OFFICE O/P EST MOD 30 MIN: CPT | Performed by: INTERNAL MEDICINE

## 2021-06-05 PROCEDURE — 70450 CT HEAD/BRAIN W/O DYE: CPT | Performed by: EMERGENCY MEDICINE

## 2021-06-05 PROCEDURE — 71111 X-RAY EXAM RIBS/CHEST4/> VWS: CPT | Performed by: EMERGENCY MEDICINE

## 2021-06-05 PROCEDURE — 99220 INITIAL OBSERVATION CARE,LEVL III: CPT | Performed by: INTERNAL MEDICINE

## 2021-06-05 RX ORDER — SODIUM CHLORIDE 9 MG/ML
INJECTION, SOLUTION INTRAVENOUS CONTINUOUS
Status: DISCONTINUED | OUTPATIENT
Start: 2021-06-05 | End: 2021-06-07

## 2021-06-05 RX ORDER — TRAZODONE HYDROCHLORIDE 50 MG/1
50 TABLET ORAL NIGHTLY
Status: DISCONTINUED | OUTPATIENT
Start: 2021-06-05 | End: 2021-06-07

## 2021-06-05 RX ORDER — PAROXETINE 10 MG/1
10 TABLET, FILM COATED ORAL EVERY EVENING
Status: DISCONTINUED | OUTPATIENT
Start: 2021-06-05 | End: 2021-06-07

## 2021-06-05 RX ORDER — ONDANSETRON 2 MG/ML
4 INJECTION INTRAMUSCULAR; INTRAVENOUS EVERY 4 HOURS PRN
Status: DISCONTINUED | OUTPATIENT
Start: 2021-06-05 | End: 2021-06-05

## 2021-06-05 RX ORDER — FUROSEMIDE 20 MG/1
20 TABLET ORAL
COMMUNITY

## 2021-06-05 RX ORDER — ASPIRIN 325 MG
325 TABLET ORAL DAILY
Status: DISCONTINUED | OUTPATIENT
Start: 2021-06-05 | End: 2021-06-05

## 2021-06-05 RX ORDER — HEPARIN SODIUM 5000 [USP'U]/ML
5000 INJECTION, SOLUTION INTRAVENOUS; SUBCUTANEOUS EVERY 12 HOURS SCHEDULED
Status: DISCONTINUED | OUTPATIENT
Start: 2021-06-05 | End: 2021-06-07

## 2021-06-05 RX ORDER — HYDROCODONE BITARTRATE AND ACETAMINOPHEN 5; 325 MG/1; MG/1
2 TABLET ORAL EVERY 6 HOURS PRN
Status: DISCONTINUED | OUTPATIENT
Start: 2021-06-05 | End: 2021-06-07

## 2021-06-05 RX ORDER — ASPIRIN 81 MG/1
81 TABLET ORAL DAILY
Status: DISCONTINUED | OUTPATIENT
Start: 2021-06-05 | End: 2021-06-07

## 2021-06-05 RX ORDER — ASPIRIN 81 MG/1
324 TABLET, CHEWABLE ORAL ONCE
Status: COMPLETED | OUTPATIENT
Start: 2021-06-05 | End: 2021-06-05

## 2021-06-05 RX ORDER — ACETAMINOPHEN 500 MG
1000 TABLET ORAL ONCE
Status: COMPLETED | OUTPATIENT
Start: 2021-06-05 | End: 2021-06-05

## 2021-06-05 RX ORDER — ONDANSETRON 2 MG/ML
4 INJECTION INTRAMUSCULAR; INTRAVENOUS EVERY 6 HOURS PRN
Status: DISCONTINUED | OUTPATIENT
Start: 2021-06-05 | End: 2021-06-07

## 2021-06-05 RX ORDER — NITROGLYCERIN 0.4 MG/1
0.4 TABLET SUBLINGUAL EVERY 5 MIN PRN
Status: DISCONTINUED | OUTPATIENT
Start: 2021-06-05 | End: 2021-06-07

## 2021-06-05 RX ORDER — HYDROCODONE BITARTRATE AND ACETAMINOPHEN 5; 325 MG/1; MG/1
1 TABLET ORAL EVERY 6 HOURS PRN
Status: DISCONTINUED | OUTPATIENT
Start: 2021-06-05 | End: 2021-06-07

## 2021-06-05 RX ORDER — PANTOPRAZOLE SODIUM 40 MG/1
40 TABLET, DELAYED RELEASE ORAL EVERY MORNING
Status: DISCONTINUED | OUTPATIENT
Start: 2021-06-05 | End: 2021-06-07

## 2021-06-05 RX ORDER — HYDROCODONE BITARTRATE AND ACETAMINOPHEN 5; 325 MG/1; MG/1
1-2 TABLET ORAL EVERY 6 HOURS PRN
Status: DISCONTINUED | OUTPATIENT
Start: 2021-06-05 | End: 2021-06-05 | Stop reason: SDUPTHER

## 2021-06-05 NOTE — H&P
[facility]PITAL[facility]                                                               History & Physical         Page Ortiz Patient Status:  Observation    1929 MRN [id number]   [facility] 2NE-A Attending DO ADRIAN Brown COLONOSCOPY  2011    adenoma, Dr. Vickey Newell.    • EGD  12/2011    dilation of esophageal stricture, Dr. Vickey Newell   • HERNIA SURGERY     • OTHER SURGICAL HISTORY      prostate   • PATIENT DOCUMENTED NOT TO HAVE EXPERIENCED ANY OF THE FOLLOWING EVENTS Disp: 20 tablet, Rfl: 0  Levothyroxine Sodium 125 MCG Oral Tab, Take 1 tablet (125 mcg total) by mouth before breakfast., Disp: 90 tablet, Rfl: 0  Acetaminophen ER (TYLENOL 8 HOUR ARTHRITIS PAIN) 650 MG Oral Tab CR, Take 650 mg by mouth 2 (two) times daily 06/05/2021    CA 8.8 06/05/2021    ALB 3.0 06/05/2021    ALKPHO 108 06/05/2021    BILT 0.9 06/05/2021    TP 7.0 06/05/2021    AST 59 06/05/2021    ALT 51 06/05/2021    TROP 0.047 06/05/2021       Recent Labs   Lab 06/05/21  0346   TROP 0.047*     Imaging: fractures.  No acute rib fracture identified. 2. No pneumothorax. 3. Chronic pleural and parenchymal changes noted as described above.     4. Cardiomegaly.  Coronary artery stents are noted.  Mild vascular congestion.    5. Stable small right pleural th

## 2021-06-05 NOTE — ED PROVIDER NOTES
Patient Seen in: [facility] Emergency Department      History   Patient presents with:  Pain    Stated Complaint: L RIB/SHOULDER PAIN    HPI/Subjective:   HPI    20-year-old male with past medical history of A. fib, coronary disease, dementia, hyper CATARACT WITH INTRAOCULAR LENS IMPLANT 84316;  Surgeon: Amy Chan MD;  Location: [facility], [facility]   • PATIENT [address] FOR IV ANTIBIOTIC SURGICAL SITE INFECTION PROPHYLAXIS.   2/19/2014    Procedure: RIGHT PHACOEMULSIFICATIO no abdominal tenderness. Musculoskeletal:         General: Normal range of motion. Cervical back: Neck supple. Skin:     General: Skin is warm. Neurological:      General: No focal deficit present. Mental Status: He is alert.       Cranial N coronary stent, not substantially changed. Small right pleural effusion/thickening with associated mild right lower lobe/middle lobe atelectasis. The lungs are clear without consolidation or pulmonary edema. No pneumothorax.   No displaced acute fractu elevated troponin with earlier report of chest pain, will admit to the hospital for continued monitoring and care. Patient and family updated results and agreeable to admission. I spoke with the hospitalist in regards to admission.     Admission dispositi

## 2021-06-05 NOTE — CM/SW NOTE
EDCM was asked to discuss OON status with patient and daughters at bedside. The OON status was not flagged in the system but it was showing to registration as [address] so questionable OON.  Daughter is able to log on her dad's insurance website and it

## 2021-06-05 NOTE — PROGRESS NOTES
Admission skin check completed w/ Misbah Prabhakar. Patient has small abrasion on top of head that is scabbed over. No other skin issues noted at this time. Continuing to monitor.

## 2021-06-05 NOTE — CONSULTS
[facility]  Cardiology Consultation    Catherine Hopkins Patient Status:  Observation    1929 MRN [id number]   [facility] 2NE-A Attending Patrick Quinonez DO   Hosp Day # 0 PCP Adryan Hanks DO     Reason for Consultation • PATIENT [place] PREOPERATIVE ORDER FOR IV ANTIBIOTIC SURGICAL SITE INFECTION PROPHYLAXIS. 2/19/2014    Procedure: RIGHT PHACOEMULSIFICATION OF CATARACT WITH INTRAOCULAR LENS IMPLANT 78175;  Surgeon:  Allen Gutiérrez MD;  Location: [address] [place] [address], Resp 18   Wt 140 lb (63.5 kg)   SpO2 100%   BMI 20.67 kg/m²   Temp (24hrs), Av.9 °F (36.6 °C), Min:97.7 °F (36.5 °C), Max:98.1 °F (36.7 °C)       Intake/Output Summary (Last 24 hours) at 2021 1535  Last data filed at 2021 0453  Gross per 24 rupal Hg.     Impressions: This study is compared with previous dated 8/3/20: PASP has  decreased to 58 mmHg from 77 mmHg.     Impression:  Principal Problem:    Chest pain, rule out acute myocardial infarction  Active Problems:    Coronary artery disease involv

## 2021-06-05 NOTE — SLP NOTE
ADULT SWALLOWING EVALUATION    ASSESSMENT    ASSESSMENT/OVERALL IMPRESSION:  Pt seen this afternoon for BSSE due to modified consistency diet. Nurse approved this evaluation.  No family was present.     Pt is a 79 y/o M who was admitted to [facility] t to be made at that time. Compensatory Strategies Recommended: No straws; Slow rate;Small bites and sips  Aspiration Precautions: Upright position; Slow rate;Small bites and sips; No straw  Medication Administration Recommendations: Crushed in puree     Jaciel pharyngeal retention. Patient was not observed to aspirate any po. Patient able to clear residue from pharynx with multiple dry swallows but required cues to do so.   Suspect aspiration noted during esophagram completed 5/10/18 was due to nature of rate a note: Silent aspiration cannot be evaluated clinically.  Videofluoroscopic Swallow Study is required to rule-out silent aspiration.)    Esophageal Phase of Swallow: No complaints consistent with possible esophageal involvement    GOALS  Goal #1 The patient

## 2021-06-05 NOTE — PLAN OF CARE
Assumed pt care at 0730. A&Ox1, to self. RA, denies chest pain/SOB, lung sounds clear, VSS, Afib on tele. Incontinent, briefed. Up with 1/walker. POC echo today, card consult, troponins, IVF, Video swallow Monday.  POC updated with pt and family, questions threatening arrhythmias  - Monitor electrolytes and administer replacement therapy as ordered  Outcome: Progressing     Problem: NEUROLOGICAL - ADULT  Goal: Achieves stable or improved neurological status  Description: INTERVENTIONS  - Assess for and repor

## 2021-06-05 NOTE — SLP NOTE
Orders received for swallow evaluation due to modified diet. Patient currently receiving echocardiogram. Will return later today an re-attempt evaluation. Abram Nunn M.A., CCC-SLP

## 2021-06-05 NOTE — ED INITIAL ASSESSMENT (HPI)
Complained of L rib/lateral chest pain tonight. + shoulder pain L. Hx of fall landing on L side 1 week ago.  Alert x 2 per norm

## 2021-06-06 PROCEDURE — 99225 SUBSEQUENT OBSERVATION CARE: CPT | Performed by: INTERNAL MEDICINE

## 2021-06-06 NOTE — BH PROGRESS NOTE
Up w/ 1 assisst w/ walker  Patient able to  Do few steps and both knees wobble  Up in chair patient  Tries to get up does not remember to use call light alarms in use  Frequent checks   Physical therapist will see patient

## 2021-06-06 NOTE — SLP NOTE
SPEECH DAILY NOTE - INPATIENT    ASSESSMENT & PLAN   ASSESSMENT  Pt seen for monitor of diet consistency tolerance.  Pt received sitting up in chair and feeding himself breakfast. Pt able to take small, controlled bites and sips of puree and and moderately

## 2021-06-06 NOTE — PHYSICAL THERAPY NOTE
PHYSICAL THERAPY QUICK EVALUATION - INPATIENT    Room Number: 6074/2884-W  Evaluation Date: 6/6/2021  Presenting Problem: weakness, L shoulder and chest wall pain  Physician Order: PT Eval and Treat     History of present illness: pt was admitted 6/5/21 RIGHT PHACOEMULSIFICATION OF CATARACT WITH INTRAOCULAR LENS IMPLANT 19829;  Surgeon: Alex Dove MD;  Location: [address] [place] [address]   • PATIENT Leah Neyda FOR IV ANTIBIOTIC SURGICAL SITE INFECTION PROPHYLAXIS.   2/19/2014    Proced Little   -   Need to walk in hospital room?: A Little   -   Climbing 3-5 steps with a railing?: A Lot       AM-PAC Score:  Raw Score: 17   Approx Degree of Impairment: 50.57%   Standardized Score (AM-PAC Scale): 42.13   CMS Modifier (G-Code): CK      FUNCT

## 2021-06-06 NOTE — DISCHARGE SUMMARY
[facility] HOSPITALIST  DISCHARGE SUMMARY     Cornelia Rashid Patient Status:  Observation    1929 MRN [id number]   [facility] 2NE-A Attending Kalpesh Carrlilo DO   Hosp Day # 0 PCP Kisha Park DO     Date of Admission: 20 Moderate Risk of readmission after discharge from the hospital.    Procedures during hospitalization:   • none    Incidental or significant findings and recommendations (brief descriptions):  • As above    Lab/Test results pending at Discharge:   · none Tbcr  Generic drug: Acetaminophen ER      Take 650 mg by mouth 2 (two) times daily. Refills: 0            [place] reviewed[place]    Follow-up appointment:   No follow-up provider specified.   Appointments for Next 30 Days 6/7/2021 - 7/7/2021 Comment    None

## 2021-06-06 NOTE — CM/SW NOTE
Spoke with RN at [facility] where patient is from. Patient is currently in assisted living. The plan set in place prior to this hospitalization was for patient to be moved into memory care either Monday or Tuesday of this coming week.

## 2021-06-06 NOTE — PLAN OF CARE
Assume care of patient around 1600. Patient sitting up in bed, oriented to self. Daughter at bedside. Patient denies any shortness of breath. Room air, . Afib controlled on tele. IV fluids running per order.  Patient able to ambulate with 1x assist, walk replacement therapy as ordered  Outcome: Progressing     Problem: NEUROLOGICAL - ADULT  Goal: Achieves stable or improved neurological status  Description: INTERVENTIONS  - Assess for and report changes in neurological status  - Initiate measures to preven

## 2021-06-06 NOTE — PLAN OF CARE
Assumed care of patient around [address]. Patients family members at bedside. Patient is alert and oriented to self. Denies any chest pain, lightheadedness or shortness of breath. On room air. A fib on tele. Voids. Incontinent of bowel and bladder.  Up with 1 an Outcome: Progressing     Problem: NEUROLOGICAL - ADULT  Goal: Achieves stable or improved neurological status  Description: INTERVENTIONS  - Assess for and report changes in neurological status  - Initiate measures to prevent increased intracranial press

## 2021-06-06 NOTE — PLAN OF CARE
Pt and VS remained stable this shift. Alert to self. Denies CP/SOB. Up in chair at bedside with gait belt and busy apron. Able to feed self with set up and intermittent assistance. PT worked with pt this morning.  Speech feels pt would benefit from video sw

## 2021-06-06 NOTE — PROGRESS NOTES
[facility] HOSPITALIST  Progress Note     David Gracia Patient Status:  Observation    1929 MRN [id number]   [facility] 2NE-A Attending Alcira Mccoy DO   Hosp Day # 0 PCP Griselda Khan DO     Chief Complaint: chest/rib/sh 06/05/2021    COVID19 Not Detected 08/03/2020       Pro-Calcitonin  No results for input(s): PCT in the last 168 hours.     Cardiac  Recent Labs   Lab 06/05/21  0346 06/05/21  0741 06/05/21  1313   TROP 0.047* 0.052* <0.045       Creatinine Kinase  No resul DO

## 2021-06-06 NOTE — PROGRESS NOTES
06/05/21 2003   Clinical Encounter Type   Visited With Patient   Routine Visit Introduction   Continue Visiting No  (if patient or family requests a visit that would be a good time to follow up)   Patient Spiritual Encounters   Spiritual Assessment Comp

## 2021-06-07 ENCOUNTER — APPOINTMENT (OUTPATIENT)
Dept: GENERAL RADIOLOGY | Facility: HOSPITAL | Age: 86
End: 2021-06-07
Attending: INTERNAL MEDICINE
Payer: MEDICARE

## 2021-06-07 VITALS
DIASTOLIC BLOOD PRESSURE: 62 MMHG | OXYGEN SATURATION: 99 % | SYSTOLIC BLOOD PRESSURE: 132 MMHG | RESPIRATION RATE: 20 BRPM | WEIGHT: 140 LBS | BODY MASS INDEX: 21 KG/M2 | TEMPERATURE: 98 F | HEART RATE: 67 BPM

## 2021-06-07 PROCEDURE — 99217 OBSERVATION CARE DISCHARGE: CPT | Performed by: INTERNAL MEDICINE

## 2021-06-07 PROCEDURE — 74230 X-RAY XM SWLNG FUNCJ C+: CPT | Performed by: INTERNAL MEDICINE

## 2021-06-07 NOTE — CM/SW NOTE
Voicemail message left with pt's daughter, Nils Hines, to discuss discharge plans and return to [facility]. Await call back.     Kd Escobar, MSW, [facility]   / Discharge Planner  [id number]

## 2021-06-07 NOTE — VIDEO SWALLOW STUDY NOTE
ADULT VIDEOFLUOROSCOPIC SWALLOWING STUDY    Admission Date: 6/5/2021  Evaluation Date: 06/07/21  Radiologist: Ronna Boogie    RECOMMENDATIONS   Diet Recommendations - Solids: Puree  Diet Recommendations - Liquids:  Thin Liquids    Further Follow-up:  Follow Up Goals: NA     ASSESSMENT   DYSPHAGIA ASSESSMENT  Test completed in conjunction with Radiologist.  Patient Positioned: Upright;Midline. Patient Viewed: Lateral.  Patient Alertness: Fully alert. Consistencies Presented: Thin liquids; Nectar thick liquids/ M lack of contrast within the airway. Patient appears safe to initiate a pureed diet and thin liquids. Bolus size and rate of intake should be limited. SLP will continue to follow to monitor diet tolerance and adjust as appropriate.                GOALS  Goal

## 2021-06-07 NOTE — PLAN OF CARE
Assumed patient care at 0730 this AM. Patient alert to self only. Per report, glasses and hearing aids are at [facility] facility still. SPO2 maintained on Ra, no c/o SOB. Afib on tele, HR controlled. Heparin subcutaneous for DVT prophylaxis.  IVF infusing rhythm, and trends  - Monitor for bleeding, hypotension and signs of decreased cardiac output  - Evaluate effectiveness of vasoactive medications to optimize hemodynamic stability  - Monitor arterial and/or venous puncture sites for bleeding and/or hematom 4 side rails  - Instruct patient/family to notify RN of any seizure activity  - Instruct patient/family to call for assistance with activity based on assessment  Outcome: Progressing  Goal: Achieves maximal functionality and self care  Description: Harriet Lopez

## 2021-06-07 NOTE — PLAN OF CARE
Assumed care of patient around 1. Patient is alert and oriented to self. Denies any chest pain, lightheadedness or shortness of breath. On room air. A fib on tele. Voids. Incontinent of bowel and bladder. Up with 1 and a walker. IVF infusing at 50ml/hr. anti-seizure medications as ordered  - Monitor neurological status  Outcome: Progressing  Goal: Remains free of injury related to seizure activity  Description: INTERVENTIONS:  - Maintain airway, patient safety  and administer oxygen as ordered  - Monitor hospital    Interventions:  - take meds as prescribed, attend follow up appts, eat healthy/exercise  - See additional Care Plan goals for specific interventions     Outcome: Progressing  Goal: Patient/Family Short Term Goal  Description: Patient's Short Te

## 2021-06-07 NOTE — CM/SW NOTE
[facility] called [facility]: [phone] in regards to where the community is at on memory care placement for the pt.  Jadyn (whom I was originally speaking with) stated that their supervisor is off today and that they would have to wait until tomorrow (Tuesday) to

## 2021-06-07 NOTE — CM/SW NOTE
Spoke with Jerie Kussmaul at [facility] today, she confirms that pt can return today and that they have the capacity to care for pt with the 24 hour supervision recommendation.  They will plan on pt returning to the assisted living side with plans to move into the [facility]

## 2021-06-07 NOTE — OCCUPATIONAL THERAPY NOTE
OCCUPATIONAL THERAPY QUICK EVALUATION - INPATIENT    Room Number: 0579/0950-V  Evaluation Date: 6/7/2021     Type of Evaluation: Quick Eval  Presenting Problem: Chest pain    Physician Order: IP Consult to Occupational Therapy  Reason for Therapy:  ADL/IAD Date   • ANGIOGRAM     • ANGIOPLASTY (CORONARY)  1/06/2006   • ANGIOPLASTY (CORONARY)  09/07/2006    with stent   • COLONOSCOPY  2011    adenoma, Dr. Paolo Rodríguez.    • EGD  12/2011    dilation of esophageal stricture, Dr. Berry Oconnell shoulder flexion impaired at baseline    Upper extremity strength is within functional limits     NEUROLOGICAL FINDINGS  Neurological Findings: Coordination - Finger Opposition        Coordination - Finger Opposition: Symmetrical       ACTIVITY TOLERANCE light within reach;RN aware of session/findings; All patient questions and concerns addressed; Alarm set; Discussed recommendations with /    ASSESSMENT     Patient is a 80year old male admitted on 6/5/2021 for chest, shoulder, and r

## 2022-04-07 NOTE — PROGRESS NOTES
08/10/20 0827   Clinical Encounter Type   Routine Visit   (Patient anticipates discharge.   remains availalbe at pager [phone] as needed. ) Kidney Transplant Program  [facility]  [facility]  [address]  [place] [address]  P [phone]  F [phone]    PATIENT: Rosina Raza  MRN: [id number]  : 1987    Chief Complaint   Patient presents with   • Follow-up       History of Present Illness:  35 year old  female here for a followup.  Underwent plasmapheresis.  Had internal stent placed and the nephrostomy tube removed on 3/15/22. States that her blood pressures at home have been running low- 110s systolic. States that she has been feeling fatigued.  Some dyspnea on exertion.  No nausea and vomiting over the past few days.  States that she is taking her meds. Denies any f/c. No urinary complaints. No LH/dizziness. No other complaints. No diarrhea. States appetite is not good.  No peripheral edema.     Review of Systems:  Review of Systems   Constitutional: Negative for chills and fever.   HENT: Negative for congestion and ear pain.    Eyes: Negative for visual disturbance.   Respiratory: Negative for cough and shortness of breath.    Cardiovascular: Negative for chest pain and palpitations.   Gastrointestinal: Negative for abdominal pain, constipation, diarrhea, nausea and vomiting.   Endocrine: Negative.    Genitourinary: Negative for decreased urine volume, difficulty urinating, dysuria, frequency and hematuria.   Musculoskeletal: Negative for arthralgias.   Skin: Negative for rash.   Neurological: Negative for tremors and headaches.   Psychiatric/Behavioral: Negative for suicidal ideas.         History:  Past Medical History:   Diagnosis Date   • Anemia    • Anxiety    • Asthma    • Bilateral hypertensive retinopathy    • Blood clot associated with vein wall inflammation     PE in    • CKD (chronic kidney disease)    • Difficult intubation    • Heart murmur    • Hypertension    • Mitral regurgitation    • Obesity    • Otitis media    • Peritoneal dialysis catheter in  place (CMS/[facility])    • Pre-transplant evaluation for kidney transplant    • Pyelonephritis    • Sleep apnea    • Systemic lupus erythematosus (CMS/[facility])    • Transfusion reaction      Past Surgical History:   Procedure Laterality Date   • Abdomen surgery      PD Cath Surgery    • Kidney surgery     • Kidney transplant  12/09/2019   • Nephrostomy Left    • Peritoneal catheter removal     • Peritoneal dialysis system         Current Outpatient Medications   Medication Sig Dispense Refill   • predniSONE (DELTASONE) 5 MG tablet Take 1 tablet by mouth daily (with breakfast). 90 tablet 1   • magnesium oxide (MAG-OX) 400 MG tablet Take 1 tablet by mouth 2 times daily. 180 tablet 1   • valGANciclovir (Valcyte) 450 MG tablet Take 1 tablet by mouth daily. 90 tablet 1   • folic acid (FOLATE) 1 MG tablet Take 1 tablet by mouth daily. Do not start before March 29, 2022. 30 tablet 0   • meclizine (ANTIVERT) 25 MG tablet Take 1 tablet by mouth 3 times daily as needed for Dizziness. 30 tablet 0   • tacrolimus (ENVARSUS XR) 1 MG extended-release tablet Take 3 tablets by mouth daily. Prevents rejection. On lab days, do not take until AFTER blood draws. 90 tablet 5   • mycophenolate (Myfortic) 360 MG DR tablet Take 2 tablets by mouth 2 times daily. 120 tablet 3   • carvedilol (COREG) 25 MG tablet Take 1 tablet by mouth every 12 hours. 60 tablet 5   • famotidine (PEPCID) 20 MG tablet Take 1 tablet by mouth daily. Do not start before February 17, 2022. 30 tablet 0   • ferrous sulfate 325 (65 FE) MG tablet Take 1 tablet by mouth daily (with breakfast). Do not start before February 17, 2022. 30 tablet 0   • hydroxychloroquine (PLAQUENIL) 200 MG tablet Take 1 tablet by mouth 2 times daily (with meals). 60 tablet 3   • albuterol 108 (90 Base) MCG/ACT inhaler Inhale 2 puffs into the lungs every 4 hours as needed for Shortness of Breath, Wheezing or Other (prolonged coughing episode). 1 each 0   • ipratropium-albuterol (DUONEB) 0.5-2.5 (3)  MG/3ML nebulizer solution Take 3 mLs by nebulization every 6 hours as needed for Wheezing or Shortness of Breath. 30 mL 3   • montelukast (SINGULAIR) 10 MG tablet Take 1 tablet by mouth nightly. For allergic rhinitis. 60 tablet 3   • atorvastatin (LIPITOR) 40 MG tablet TAKE 1 TABLET BY MOUTH AT BEDTIME 90 tablet 3     No current facility-administered medications for this visit.         OBJECTIVE     Visit Vitals  BP 99/73 (BP Location: RUE - Right upper extremity, Patient Position: Sitting, Cuff Size: Large Adult)   Pulse 79   Temp 97.3 °F (36.3 °C) (Temporal)   Resp 20   Ht 5' 5.43\" (1.662 m)   Wt 117.6 kg (259 lb 4.2 oz)   LMP 02/19/2022 (Approximate)   SpO2 99%   BMI 42.57 kg/m²       Physical Exam:  Physical Exam  Constitutional:       Appearance: She is well-developed.   HENT:      Head: Normocephalic.   Eyes:      Pupils: Pupils are equal, round, and reactive to light.   Neck:      Vascular: No JVD.      Comments: Permacath +  Cardiovascular:      Rate and Rhythm: Normal rate and regular rhythm.      Heart sounds: Normal heart sounds. No murmur heard.    No friction rub.   Pulmonary:      Effort: Pulmonary effort is normal. No respiratory distress.      Breath sounds: Normal breath sounds. No wheezing or rales.   Abdominal:      General: Bowel sounds are normal. There is no distension.      Palpations: Abdomen is soft.      Tenderness: There is no abdominal tenderness.      Comments: Left lower quadrant inc well healed, perc neph site healed, non tender abdomen. No guarding or rigidity.   Musculoskeletal:         General: Normal range of motion.      Cervical back: Neck supple.   Skin:     Findings: No rash.   Neurological:      Mental Status: She is alert and oriented to person, place, and time.      Deep Tendon Reflexes: Reflexes are normal and symmetric.         Labs  reviewed    ASSESSMENT/PLAN  1. Elevated serum creatinine    2. Kidney replaced by transplant    3. Immunosuppression (CMS/HCC)    4. Benign  essential HTN    5. Cytomegalovirus (CMV) viremia (CMS/HCC)    6. Anemia of renal disease      No orders of the defined types were placed in this encounter.    35 year old AA female with PMH significnat for ESRD on PD,SLE, lupus nephritis, HTN, been on RRT for 11 years, now s/p DDKT 12/9/19, history of a possible TIA in December 2019, on aspirin and atorvastatin, history of E. coli pyelonephritis in October 2021, admitted with Klebsiella pneumoniae transplant pyelonephritis Feb 2022, acute allograft dysfunction creatinine elevated to 8.6 from a baseline of 1, hydronephrosis s/p percutaneous nephrostomy tube 2/4/22 and transplant kidney biopsy.       - s/p DDKT 12/9/19: good immediate graft function. Creatinine baseline was around 1-1.2.  Now with acute allograft dysfunction and creatinine elevated to 8.6 on 2/1/22.  Elevated creatinine was found to be multifactorial; secondary to obstruction, pyelonephritis, AMR and lupus nephritis.    Creat cont to improve --> was down to 2.71 on 2/14/22.  Was 2.97 on 2/22/2022 and up to 3.48 on 3/1/22 and 6.61 on 3/18/22 and 4.09 today on 4/7/22. No acute RRT needs. However, pt made aware that she may need it soon, if she continues to feel poorly. D/w Dr Lynn. Check chronic hep B panel. Has internal jugular permcath if needed for HD initiation. Will f/u with her as well. We will continue to monitor. Monitor tacrolimus levels.  Urine protein to creatinine ratio on 2/22/2022 was 627 mg/g.  On 3/18/33 was 3g/g --> better at 1g on 4/7/22.    Avoid dehydration and nephrotoxins.  Renally dose meds.  2/4/22:  Evidence of + pyelonephritis, diffuse CD4 +,  With evidence of AMR, and class 2 LN (final read pending). Minimal sclerosis and fibrosis.  Patient received 3 empiric doses of Solu-Medrol 2/2/22, 2/3/22 and 2/6/22.  On prednisone 10 mg daily for now.     Immunosuppression as below.     - Allosure:  On 12/30/19 was 1.4, on 6/18/20 0.21.  Repeat allosure on 2/4/22 was 0.61, 2/22  was 0.55.  DSAs sent 2/4/22- positive. Strong: DQ6 (DQA1*01:03 / DQB1* 06:03)- MFI 28071; DPB1*02:01 - MFI 39559, DPB1*04:01 - MFI 9356  Weak: DQ2 (DQA1*02:01/ DQB1*02:02) - MFI 1032  Underwent plasmapheresis.  5 sessions performed between 2/9 to 2/14/22. With 20 g IVIG post pheresis on 2/ 9, 2/10, 2/11, 2/13. And 1 g/kg following the fifth session on 2/14/2022.  Had permacath placed 2/15/2022  Underwent outpt TPE- on 2/22/22, 2/25, 2/28, 3/1 and 3/4/22 followed by 20gm IVIG. Total plasma volume 3.5lt.  However 2/28/2022, refused Benadryl pre-pheresis, developed a reaction with difficulty swallowing and a rash (now resolved), improved with Benadryl and IV Solu-Medrol 125 mg.  Was also sent to the ER.  Did not get IVIG post pheresis on 2/28/2022.  Called infusion center and spoke to Winter, also discussed with patient, plan all replacement with 3.5 L albumin and no FFP for subsequent sessions scheduled on 3/1 and 3/4/22.  Agreed to Benadryl 25 mg IV pre and an additional 25 mg during the session if needed. Also, changed hydrocortisone premedication to Solu-Medrol 75 mg IV.    Repeat DSAs: better. 3/18/22 DSAs- Strong: DQ6 (DQA1*01:03 / DQB1* 06:03)- MFI 68513; DPB1*02:01 - MFI 5545,   Weak: C*02 - MFI 1779, DPB1*04:01 - MFI 1323   Once acute issues resolve, may need more TPE  However, new CMV viremia. Hold off for now and cut back IS.    Recheck DSA, Allosure, CMV in 2 weeks to reassess.    - CMV viremia: new. On 3/24/22 was 9260. Cut back IS. D/w Dr Levy. Will need valcyte. Renally dose     QuantiFERON gold on 2/8/2022 was indeterminate.  Was being assessed by ID/ Dr Dotson for clearance for Rituxan as an outpatient. d/w Dr Shane       - Anemia: recent GI evaluation. d/w GI /Dr Mariscal / Dr Payne- on 3/25/2- EGD: esophagitis and erosive gastritis.  3/25/22: Colonoscopy- normal. Next in 10 yrs  Cont to monitor    -Obstruction: Seen by  urology.  S/p percutaneous nephrostomy on 2/4/2022, with exchange in IR  on 3/1/22 and 3/11/22 for upsizing.  On 3/15/22: had percutaneous nephrostomy removed and ureteral stent placed.  F/u imaging- ok     - Immunosuppression: induced with basiliximab (hist of rabbit exposure) and solumedrol.  Immunosuppression as above.  S/p 3 doses Solu-Medrol  on 2/2/22, 2/3/22 and 2/6/22.   Given CMV viremia, will cut back IS. On Myfortic --> decrease from 720 mg every 12h to 360mg q12h, Envarsus XR 3mg daily po.  Goal lower - 8-10 for now. Will monitor tacrolimus trough levels. Continue prednisone 10mg--> cut back to 5mg daily today 4/7/22 (cut back from 20mg to 10mg on 3/2/22).      - Lupus: Given the biopsy findings of lupus nephritis. C3-C4- on 2/7/22 wnl and dsDNA 2/2/22 - negative.  Seen by Dr. Azul in the past. Immunosuppression as above. Ok to taper down prednisone.     - HTN: BP running low. Continue to check her home blood pressures.  On carvedilol 25 mg twice daily, hydralazine 100 mg every 8 hours.  Hold hydralazine.       - Lytes: Monitor.  Hypomag- 1.5. Add 1-2 mag ox/day as tolerated     - GI proph: Cont on famotidine.  To continue especially while on maintenance prednisone.     -History of an adnexal cyst: Has followed up with GYN in the past.    D/w Dr. Sweetie Lynn (neph) and Dr Svitlana Tsang MD, MPH  Medical Director and Primary Transplant Nephrologist  Kidney Transplant Program

## (undated) NOTE — IP AVS SNAPSHOT
[address]            (For Outpatient Use Only) Initial Admit Date: 8/3/2020   Inpt/Obs Admit Date: Inpt: N/A / Obs: 08/03/20   Discharge Date:    Link Pattee:  [de-identified]   MRN: [de-identified]   CSN: [id number]   CEID: [id number] Subscriber ID:  Pt Rel to Subscriber:    Hospital Account Financial Class: Medicare Advantage    August 10, 2020

## (undated) NOTE — IP AVS SNAPSHOT
Patient Demographics     Address  [address] [place] [address] Phone  [phone] [facility]) *Preferred*  [phone] [facility]) E-mail Address  [email or web address]      Emergency Contact(s)     Name Relation Home Work Abigail Osborne Daughter MD         Pantoprazole Sodium 40 MG Tbec  Commonly known as: PROTONIX  Next dose due: 6/8      TAKE 1 TABLET BY MOUTH EVERY MORNING   Ronak Zhong MD         PARoxetine HCl 10 MG Tabs  Commonly known as: PAXIL  Next dose due:  Tonight 6/7      TAKE 1 TABLE Vitals  132/62 Filed at 06/07/2021 1211   Pulse  67 Filed at 06/07/2021 1211   Resp  20 Filed at 06/07/2021 1211   Temp  98.2 °F (36.8 °C) Filed at 06/07/2021 1211   SpO2  99 % Filed at 06/07/2021 1211      Patient's Most Recent Weight       Most Recent V the emergency room for evaluation from skilled nursing facility . Patient poor historian with dementia. Denies any abdominal pain. Denies any nausea vomiting. No diarrhea. No focal weakness. Patient has history of CAD with previous stent.   Chest pain • Hypertension Mother    • Cancer Sister    • Cancer Brother    • Heart Disease Neg    • Stroke Neg       Reviewed    Social history:   reports that he has never smoked.  He has never used smokeless tobacco. He reports that he does not drink alcohol and 21, weight 140 lb (63.5 kg), SpO2 98 %. General: No acute distress. HEENT: Moist mucous membranes. EOM-I. PERRL  Neck: No lymphadenopathy. No JVD. No carotid bruits. Respiratory: Clear to auscultation bilaterally. No wheezes. No rhonchi.   Cardiovascu 9/17/2020 which would favor fibrosis although superimposed interstitial edema or pneumonitis is not entirely excluded. CARDIAC: Dorette Angelica is stable cardiomegaly.  Right coronary artery stents are noted.  There is mild vascular congestion.    MEDIASTINUM:  Th Hypothyroidism  1. Continue home levothyroxine  4. Dementia  1. Continue home medication  5. History of prostate cancer with previous prostatectomy  6. Status post fall at assisted living  1. Fall precaution  2.  PT OT dedra[MJ.2]      Quality:  · DVT Prophy shows afib with slow ventricular response  First troponin minimally elevated at 0.047, then 0.052 and <0.045 at 1300 today    History:  Past Medical History:   Diagnosis Date   • Arrhythmia    • Atrial fibrillation [facility])     cards: Dr. Steve Reed   • CAD ( smokeless tobacco. He reports that he does not drink alcohol and does not use drugs.     Allergies:  No Known Allergies    Medications:    Current Facility-Administered Medications:   •  nitroGLYCERIN (NITROSTAT) SL tab 0.4 mg, 0.4 mg, Sublingual, Q5 Min CO Lab Results   Component Value Date    WBC 7.2 06/05/2021    HGB 12.9 06/05/2021    HCT 40.0 06/05/2021    .0 06/05/2021    CREATSERUM 0.88 06/05/2021    BUN 33 06/05/2021     06/05/2021    K 5.0 06/05/2021     06/05/2021    CO2 28.0 06/0 participate in the care of your patient. En Oliver MD  6/5/2021  3:35 PM[VM.1]      Electronically signed by Eleazar Caban MD on 6/5/2021  3:41 PM   Attribution Spaulding    VM. 1 - Eleazar Caban MD on 6/5/2021  3:35 PM to the emergency room for evaluation from skilled nursing facility. Patient poor historian with dementia. Denies any abdominal pain. Denies any nausea vomiting. No diarrhea. No focal weakness. Patient has history of CAD with previous stent. Chest pain con Levothyroxine Sodium 137 MCG Tabs      TAKE 1 TABLET BY MOUTH EVERY MORNING FOR HYPOTHYROIDISM   Quantity: 90 tablet  Refills: 0     Pantoprazole Sodium 40 MG Tbec  Commonly known as: PROTONIX      TAKE 1 TABLET BY MOUTH EVERY MORNING   Quantity: 90 tablet Electronically signed by Felicitas Lundy DO on 6/7/2021 12:39 PM   Attribution Spaulding    AK. 1 - Felicitas Lundy DO on 6/6/2021  8:50 AM  AK. 2 - Felicitas Lundy DO on 6/7/2021 12:38 PM  AK. 3 - Felicitas Lundy DO on 6/6/2021  2:16 PM evaluation from skilled nursing facility. Patient poor historian with dementia. Denies any abdominal pain. Denies any nausea vomiting. No diarrhea. No focal weakness. Patient has history of CAD with previous stent. Chest pain constant, more with movement Tabs      TAKE 1 TABLET BY MOUTH EVERY MORNING FOR HYPOTHYROIDISM   Quantity: 90 tablet  Refills: 0     Pantoprazole Sodium 40 MG Tbec  Commonly known as: PROTONIX      TAKE 1 TABLET BY MOUTH EVERY MORNING   Quantity: 90 tablet  Refills: 0     PARoxetine H Lizbeth Ryne, DO on 6/7/2021 12:38 PM   Attribution Key    AK. 1 - Lizbeth Ryne, DO on 6/6/2021  8:50 AM  AK. 2 - Lizbeth Ryne, DO on 6/7/2021 12:38 PM  AK. 3 - Lizbeth Ryne, DO on 6/6/2021  2:16 PM                        Physi cholesterol    • History of prostate cancer 08/11/2010    s/p prostatectomy.  urology: Dr. Vinay Bunch   • Hyperlipidemia    • Hypertension    • Hypothyroid    • Visual impairment        Past Surgical History  Past Surgical History:   Procedure Pink Bryce functional limits     NEUROLOGICAL FINDINGS                      ACTIVITY TOLERANCE                         O2 WALK       AM-PAC '6-Clicks' INPATIENT SHORT FORM - BASIC MOBILITY  How much difficulty does the patient currently have. ..  -   Turning over in b functional level and primary limitation is dementia and decreased safety awareness. Recommend 24 hour supervision for safety and min assistance for mobility.   PT Discharge Recommendations: 24 hour care/supervision    PLAN  Patient has been evaluated and p rib fracture identified. 2. No pneumothorax. 3. Chronic pleural and parenchymal changes noted as described above. 4. Cardiomegaly. Coronary artery stents are noted. Mild vascular congestion.    5. Stable small right pleural thickening or small effu INTRAOCULAR LENS IMPLANT [id number];  Surgeon: Vick Ramos MD;  Location: [address] [place] [address]   • [address] [place] [address] CATARACT EXTRACAP,INSERT LENS  2/19/2014    Procedure: RIGHT PHACOEMULSIFICATION OF CATARACT WITH INTRAOCULAR LENS IMPLANT [id number];  Surgeon:  Jessenia Kendrick toilet, bedpan or urinal? : A Little  -   Putting on and taking off regular upper body clothing?: A Little  -   Taking care of personal grooming such as brushing teeth?: A Little  -   Eating meals?: A Little[YC. 2]    AM-PAC Score:[YC.1]  Score: 18  Approx care/supervision.     Patient Complexity  Occupational Profile/Medical History  MODERATE - Expanded review of history including review of medical or therapy record   Specific performance deficits impacting engagement in ADL/IADL  LOW  1 - 3 performance defi (Documentation Required): Yes  SLP Follow-up Date: 06/08/21  Compensatory Strategies Recommended: No straws;Small bites and sips  Aspiration Precautions: Upright position  Medication Administration Recommendations: Crushed in puree  Treatment Plan/Recommen alert[DK.2]. [DK.1]  Consistencies Presented: Thin liquids; Nectar thick liquids/ Mildly thick;Puree[DK. 2] to assess oropharyngeal swallow function and assess for compensatory strategies to improve safe swallow function. [DK.1]    THIN LIQUIDS  Method of Pres will continue to follow to monitor diet tolerance and adjust as appropriate. GOALS  Goal #1 The patient will tolerate pur consistency and thin liquids without overt signs or symptoms of aspiration with 90 % accuracy over 1-2 session(s).   In P observed. Per the pt's RN, the pt may be discharged to home today and the family was inquiring about a VFSS as an OP. This [facility] stated that a VFSS most certainly could be scheduled as an OP if desired.  Will continue to plan for IP VFSS to be performed 6/ Multidisciplinary Problems     Active Goals        Problem: Patient/Family Goals    Goal Priority Disciplines Outcome Interventions   Patient/Family Long Term Goal     Interdisciplinary Progressing    Description: Patient's Long Term Goal: to stay out of h

## (undated) NOTE — IP AVS SNAPSHOT
[address]            (For Outpatient Use Only) Initial Admit Date: 6/5/2021   Inpt/Obs Admit Date: Inpt: N/A / Obs: 06/05/21   Discharge Date:    Jina Jordan:  [de-identified]   MRN: [de-identified]   CSN: [id number]   CEID: [id number] Subscriber :    Subscriber ID:  Pt Rel to Subscriber:    Hospital Account Financial Class: Medicare Advantage    2021

## (undated) NOTE — IP AVS SNAPSHOT
Patient Demographics     Address  [address] [address] Phone  [phone] [facility]) *Preferred*  [phone] [facility]) E-mail Address  [email or web address]      Emergency Contact(s)     Name Relation Home Work [employer] Ирина Bridgette Lanza MD In 2 weeks.     Specialties:  Cardiovascular Diseases, CARDIOLOGY  Why:  Or Dr. Roche Art with [facility] information:  [address] [place] [address] [address] [phone], [facility]    Phone:  [phone]   Levothyroxine Sodium 125 MCG Tabs  Pantoprazole Sodium 40 MG Tbec           [id number] - MAR ACTION REPORT  (last 24 hrs)    ** SITE UNKNOWN **     Order ID Med ---------                               -----------         ------                     CBC W/ DIFFERENTIAL[[id number]]          Abnormal            Final result                 Please view results for these tests on the individual orders.     Specimen Inform have increased swelling to his legs a couple of weeks ago with pitting edema and so was started on lasix. He apparently was told to take it until the edema improved.   The daughter notes he still had edema at the end of last week but now it has resolved an Methylcellulose, Laxative, (CITRUCEL) Oral Powder, Take 1 packet by mouth daily. , Disp: , Rfl:   aspirin 81 MG Oral Tab, Take 1 tablet by mouth daily. , Disp: 90 tablet, Rfl: 3          Soc Hx  Social History    Tobacco Use      Smoking status: Never Smok .0       Recent Labs   Lab 08/03/20 0605      K 3.7      CO2 27.0   BUN 23*   CREATSERUM 0.94   *   CA 9.5       Recent Labs   Lab 08/03/20 0605   ALT 36   AST 61*   ALB 3.7       Recent Labs   Lab 08/03/20 0605   TROP 0.147* related atrophy. INTRACRANIAL:  Diffuse decreased attenuation in periventricular white matter is consistent with chronic small vessel ischemic change. There is no evidence of acute ischemia, hemorrhage or mass.  SINUSES:           No sign of acute sinusiti joint osteophytes cause mild bilateral foraminal stenosis. C5-C6:  Bilateral uncovertebral joint osteophytes, diffuse endplate osteophyte and facet hypertrophy cause mild central canal and moderate bilateral foraminal stenosis.  C6-C7:  Bilateral uncoverteb -cont fluoxetine and nightly trazodone    Dispo - monitor on tele    Outpatient records reviewed confirming patient's medical history and medications. Further recommendations pending patient's clinical course.   [facility] hospitalist to continue to follow pat hypothyroidism is admitted after 2 falls that occurred Saturday night and then Sunday night. He was unable to get up on both occasions and so lied on the floor overnight until he was assisted. He wears a life alert but never pressed it.   He was noted to Premorbid Functional Status: Patient was independent with mobility/ambulation, transfers, ADL's, IADL's.   Support System and Family Circumstances (i.e., potential caregivers): lives alone  Home Environment / Accessibility: 1-story house/ trailer  Current F aspirin EC tab 81 mg, 81 mg, Oral, Daily  docusate sodium (COLACE) cap 100 mg, 100 mg, Oral, BID PRN  psyllium (METAMUCIL SMOOTH TEXTURE) 28 % packet 1 packet, 1 packet, Oral, Daily PRN  PARoxetine HCl (PAXIL) tab 10 mg, 10 mg, Oral, Daily  PEG 3350 (BRIDGET Eyes:  Conjunctivae/lids clear. PERRL, EOMs intact. Vision functional.   Ears/Nose/Throat: Hearing intact. Lips, mucosa, and tongue normal. Teeth and gums normal. Moist mucous membranes. Neck: No neck masses or thyroid enlargement/tenderness/nodules. The patient has good potential to achieve the goal to return home at [facility] I/S level of function. Advance directives reviewed and noted. Would be happy to reassess should medical and/or functional status change.    Will Interdisciplinary Adequate for Discharge    Description:  Patient's Short Term Goal: be discharged    Interventions:   -take medications as prescribed  -adhere to plan of care  -follow proper safety precautions   -report new or changing symptoms to healt